# Patient Record
Sex: MALE | Race: BLACK OR AFRICAN AMERICAN | Employment: FULL TIME | URBAN - METROPOLITAN AREA
[De-identification: names, ages, dates, MRNs, and addresses within clinical notes are randomized per-mention and may not be internally consistent; named-entity substitution may affect disease eponyms.]

---

## 2017-11-08 PROBLEM — N52.9 ERECTILE DYSFUNCTION: Status: ACTIVE | Noted: 2017-11-08

## 2019-05-03 ENCOUNTER — OFFICE VISIT (OUTPATIENT)
Dept: FAMILY MEDICINE CLINIC | Age: 44
End: 2019-05-03

## 2019-05-03 VITALS
SYSTOLIC BLOOD PRESSURE: 122 MMHG | WEIGHT: 290 LBS | DIASTOLIC BLOOD PRESSURE: 78 MMHG | OXYGEN SATURATION: 98 % | RESPIRATION RATE: 16 BRPM | BODY MASS INDEX: 33.55 KG/M2 | TEMPERATURE: 96.4 F | HEART RATE: 91 BPM | HEIGHT: 78 IN

## 2019-05-03 DIAGNOSIS — L60.9 NAIL ABNORMALITIES: ICD-10-CM

## 2019-05-03 DIAGNOSIS — N52.9 ERECTILE DYSFUNCTION, UNSPECIFIED ERECTILE DYSFUNCTION TYPE: ICD-10-CM

## 2019-05-03 DIAGNOSIS — Z00.00 ROUTINE MEDICAL EXAM: ICD-10-CM

## 2019-05-03 DIAGNOSIS — E11.9 TYPE 2 DIABETES MELLITUS WITHOUT COMPLICATION, WITHOUT LONG-TERM CURRENT USE OF INSULIN (HCC): Primary | ICD-10-CM

## 2019-05-03 LAB — HBA1C MFR BLD HPLC: 6.4 %

## 2019-05-03 RX ORDER — METFORMIN HYDROCHLORIDE 1000 MG/1
1000 TABLET ORAL 2 TIMES DAILY WITH MEALS
COMMUNITY
End: 2019-05-10 | Stop reason: SDUPTHER

## 2019-05-03 RX ORDER — LANCETS
EACH MISCELLANEOUS
Qty: 100 EACH | Refills: 11 | Status: SHIPPED | OUTPATIENT
Start: 2019-05-03 | End: 2019-05-10 | Stop reason: SDUPTHER

## 2019-05-03 RX ORDER — INSULIN PUMP SYRINGE, 3 ML
EACH MISCELLANEOUS
Qty: 1 KIT | Refills: 0 | Status: SHIPPED | OUTPATIENT
Start: 2019-05-03 | End: 2019-05-10 | Stop reason: SDUPTHER

## 2019-05-03 RX ORDER — SILDENAFIL 50 MG/1
50 TABLET, FILM COATED ORAL AS NEEDED
Qty: 30 TAB | Refills: 0 | Status: SHIPPED | OUTPATIENT
Start: 2019-05-03 | End: 2019-05-10 | Stop reason: DRUGHIGH

## 2019-05-03 NOTE — PROGRESS NOTES
[address] 
             [phone] Subjective:  
Yodit Barney is a 41 y.o. male presenting for his annual checkup. ROS:  Feeling well. No dyspnea or chest pain on exertion. No abdominal pain, change in bowel habits, black or bloody stools. No urinary tract or prostatic symptoms. Endorses ED, has been evaluated by urology. No neurological complaints. No skin symptoms. No HENT symptoms, told he snores. No vision symptoms. No respiratory symptoms. Told he stops breathing when sleeping. Does not wake up with a headache. Does not feel sleepy during the day. Patient Active Problem List  
Diagnosis Code  Erectile dysfunction N52.9  Diabetes ([facility] E11.9  Controlled type 2 diabetes mellitus without complication, without long-term current use of insulin ([facility]) E11.9  
 Pulmonary nodules R91.8 Patient Active Problem List  
 Diagnosis Date Noted  Diabetes ([facility] 05/03/2019  Pulmonary nodules 11/20/2018  Controlled type 2 diabetes mellitus without complication, without long-term current use of insulin ([facility] 10/12/2018  Erectile dysfunction 11/08/2017 Current Outpatient Medications Medication Sig Dispense Refill  metFORMIN (GLUCOPHAGE) 1,000 mg tablet Take 1,000 mg by mouth two (2) times daily (with meals).  Blood-Glucose Meter monitoring kit Check blood sugar daily in the morning before eating 1 Kit 0  
 glucose blood VI test strips (ASCENSIA AUTODISC VI, ONE TOUCH ULTRA TEST VI) strip Check blood sugar daily in the morning before eating 50 Strip 6  
 sildenafil citrate (VIAGRA) 50 mg tablet Take 1 Tab by mouth as needed (ED,  take 30min before). 30 Tab 0  
 lancets misc Check glucose daily in morning before eating 100 Each 11 Allergies Allergen Reactions [id number] [facility]  Pollen Extracts Sneezing Past Medical History:  
Diagnosis Date  Diabetes ([facility] History reviewed. No pertinent surgical history. Family History Problem Relation Age of Onset  Diabetes Mother  Hypertension Mother Social History Tobacco Use  Smoking status: Light Tobacco Smoker  Smokeless tobacco: Never Used  Tobacco comment: black mild Substance Use Topics  Alcohol use: Yes Alcohol/week: 0.6 oz Types: 1 Cans of beer per week Comment: occ beer Objective:  
 
Visit Vitals /78 (BP 1 Location: Left arm, BP Patient Position: At rest) Pulse 91 Temp 96.4 °F (35.8 °C) Resp 16 Ht 6' 8\" (2.032 m) Wt 290 lb (131.5 kg) SpO2 98% BMI 31.86 kg/m² The patient appears well, alert, oriented x 3, in no distress. ENT normal.  Neck supple. No adenopathy or thyromegaly. BETZAIDA. Lungs are clear, good air entry, no wheezes, rhonchi or rales. S1 and S2 normal, no murmurs, regular rate and rhythm. Abdomen is soft without tenderness, guarding, mass or organomegaly.  exam: deferred  Extremities show no edema, normal peripheral pulses. Neurological is normal without focal findings. Toe nails with hyperpigmentation. Assessment/Plan:  
healthy adult male 
lose weight, increase physical activity, stop smoking (advice and handout given), follow low fat diet, follow low salt diet, continue present plan, routine labs ordered, call if any problems. ICD-10-CM ICD-9-CM 1. Type 2 diabetes mellitus without complication, without long-term current use of insulin ([facility]) E11.9 250.00 MICROALBUMIN, UR, RAND W/ MICROALB/CREAT RATIO Here today to establish care and manage chronic conditions. States his glucose has been controlled as evidenced by no s/s. Needs glucometer and supplies: ordered A1C: 6.4 LIPID PANEL   
   METABOLIC PANEL, COMPREHENSIVE Blood-Glucose Meter monitoring kit   
   glucose blood VI test strips (ASCENSIA AUTODISC VI, ONE TOUCH ULTRA TEST VI) strip   
   lancets misc    AMB POC HEMOGLOBIN A1C   
 2. Routine medical exam Z00.00 V70.0 CBC W/O DIFF Routine labs for physical  
   METABOLIC PANEL, COMPREHENSIVE 3. Nail abnormalities L60.9 703.9 REFERRAL TO PODIATRY Not sure of the cause of his hyperpigmentation, will refer to podiatry. 4. Erectile dysfunction, unspecified erectile dysfunction type N52.9 607.84 sildenafil citrate (VIAGRA) 50 mg tablet States ED managed will with viagara An After Visit Summary was printed and given to the patient. All diagnosis have been discussed with the patient and all of the patient's questions have been answered. Follow-up and Dispositions · Return in about 3 months (around 8/3/2019) for Diabetes, 15 minutes. Greater than 50% of the time was spent in counseling and coordination of care. Carole Baumann, KAREEM-BC [facility] [address] [place] [address]

## 2019-05-03 NOTE — PATIENT INSTRUCTIONS
Learning About Foot and Toenail Care Foot and toenail care: Overview Checking your loved one's feet and keeping them clean and soft can help prevent cracks and infection in the skin. This is especially important for people who have diabetes. Keeping toenails trimmedand polished if that's what the person likesalso helps the person feel well-groomed. If the person you care for has diabetes or has foot problems, such as bad bunions and corns, think about taking them to see a podiatrist. This is a doctor who specializes in the care of the feet. Sometimes a podiatrist will come to the home if the person can't go out for visits. Try to take the person for salon pedicures if that is what they want. It's a chance to get out and see people and continue a favorite activity. You can do basic nail care at home. Usually all you need to do is keep the nails clean and at a safe length. How do you trim someone's toenails? Try to trim the person's nails every week. Or check the nails each week to see if they need to be trimmed. It's easiest to trim nails after the person has had a shower or foot bath. It makes the nails softer and easier to trim. Start by gathering your supplies. You will need toenail clippers and a nail file. You may also need nail polish and nail polish remover. To trim the nails: 
1. Wash and dry your hands. You don't need to wear gloves. 2. Use nail polish remover to take off any polish. 3. Hold the person's foot and toe steady with one hand while you trim the nail with your other hand. Trim the nails straight across. Leave the nails a little longer at the corners so that the sharp ends don't cut into the skin. 4. Keep the nails no longer than the tip of the toes. 5. Let the nails dry if they are still damp and soft. 6. Use a nail file to gently smooth the edges of the nails, especially at the corners. They may be sharp after the nails are cut straight. 7. Apply nail polish, if the person wants it. If the person's nails are thick and discolored, it may be safest to have a podiatrist cut them. What else do you need to know? When you're caring for someone's nails, it is important to remember not to trim or cut the cuticles. A minor cut in a cuticle could lead to an infection. Wash the feet daily in the shower or bath or in a basin made for washing feet. It's extra important to wash the feet carefully if the person has diabetes. After washing the feet, dry gently. Put lotion on the feet, especially on the heels. But don't put it between the toes. If the person doesn't have diabetes and you see signs of athlete's foot (such as dry, cracking, or itchy skin between the toes), you can try an over-the-counter medicine. These medicines can kill the fungus that causes athlete's foot. If the problem doesn't go away, talk to the person's doctor. Look every day for cuts or signs of infection, such as pain, swelling, redness, or warmth. If you see any of these signsespecially in someone who has diabetescall the doctor. Where can you learn more? Go to http://ольга-luis.info/. Enter A726 in the search box to learn more about \"Learning About Foot and Toenail Care. \" Current as of: April 18, 2018 Content Version: 11.9 © 3638-3960 [employer]. Care instructions adapted under license by [employer] (which disclaims liability or warranty for this information). If you have questions about a medical condition or this instruction, always ask your healthcare professional. [address] any warranty or liability for your use of this information. Learning About Diabetes Food Guidelines Your Care Instructions Meal planning is important to manage diabetes. It helps keep your blood sugar at a target level (which you set with your doctor).  You don't have to eat special foods. You can eat what your family eats, including sweets once in a while. But you do have to pay attention to how often you eat and how much you eat of certain foods. You may want to work with a dietitian or a certified diabetes educator (CDE) to help you plan meals and snacks. A dietitian or CDE can also help you lose weight if that is one of your goals. What should you know about eating carbs? Managing the amount of carbohydrate (carbs) you eat is an important part of healthy meals when you have diabetes. Carbohydrate is found in many foods. · Learn which foods have carbs. And learn the amounts of carbs in different foods. ? Bread, cereal, pasta, and rice have about 15 grams of carbs in a serving. A serving is 1 slice of bread (1 ounce), ½ cup of cooked cereal, or 1/3 cup of cooked pasta or rice. ? Fruits have 15 grams of carbs in a serving. A serving is 1 small fresh fruit, such as an apple or orange; ½ of a banana; ½ cup of cooked or canned fruit; ½ cup of fruit juice; 1 cup of melon or raspberries; or 2 tablespoons of dried fruit. ? Milk and no-sugar-added yogurt have 15 grams of carbs in a serving. A serving is 1 cup of milk or 2/3 cup of no-sugar-added yogurt. ? Starchy vegetables have 15 grams of carbs in a serving. A serving is ½ cup of mashed potatoes or sweet potato; 1 cup winter squash; ½ of a small baked potato; ½ cup of cooked beans; or ½ cup cooked corn or green peas. · Learn how much carbs to eat each day and at each meal. A dietitian or CDE can teach you how to keep track of the amount of carbs you eat. This is called carbohydrate counting. · If you are not sure how to count carbohydrate grams, use the Plate Method to plan meals. It is a good, quick way to make sure that you have a balanced meal. It also helps you spread carbs throughout the day. ? Divide your plate by types of foods.  Put non-starchy vegetables on half the plate, meat or other protein food on one-quarter of the plate, and a grain or starchy vegetable in the final quarter of the plate. To this you can add a small piece of fruit and 1 cup of milk or yogurt, depending on how many carbs you are supposed to eat at a meal. 
· Try to eat about the same amount of carbs at each meal. Do not \"save up\" your daily allowance of carbs to eat at one meal. 
· Proteins have very little or no carbs per serving. Examples of proteins are beef, chicken, turkey, fish, eggs, tofu, cheese, cottage cheese, and peanut butter. A serving size of meat is 3 ounces, which is about the size of a deck of cards. Examples of meat substitute serving sizes (equal to 1 ounce of meat) are 1/4 cup of cottage cheese, 1 egg, 1 tablespoon of peanut butter, and ½ cup of tofu. How can you eat out and still eat healthy? · Learn to estimate the serving sizes of foods that have carbohydrate. If you measure food at home, it will be easier to estimate the amount in a serving of restaurant food. · If the meal you order has too much carbohydrate (such as potatoes, corn, or baked beans), ask to have a low-carbohydrate food instead. Ask for a salad or green vegetables. · If you use insulin, check your blood sugar before and after eating out to help you plan how much to eat in the future. · If you eat more carbohydrate at a meal than you had planned, take a walk or do other exercise. This will help lower your blood sugar. What else should you know? · Limit saturated fat, such as the fat from meat and dairy products. This is a healthy choice because people who have diabetes are at higher risk of heart disease. So choose lean cuts of meat and nonfat or low-fat dairy products. Use olive or canola oil instead of butter or shortening when cooking. · Don't skip meals. Your blood sugar may drop too low if you skip meals and take insulin or certain medicines for diabetes. · Check with your doctor before you drink alcohol. Alcohol can cause your blood sugar to drop too low. Alcohol can also cause a bad reaction if you take certain diabetes medicines. Follow-up care is a key part of your treatment and safety. Be sure to make and go to all appointments, and call your doctor if you are having problems. It's also a good idea to know your test results and keep a list of the medicines you take. Where can you learn more? Go to http://ольга-luis.info/. Enter J316 in the search box to learn more about \"Learning About Diabetes Food Guidelines. \" Current as of: July 25, 2018 Content Version: 11.9 © 8485-8200 [employer]. Care instructions adapted under license by [employer] (which disclaims liability or warranty for this information). If you have questions about a medical condition or this instruction, always ask your healthcare professional. [place] [address] any warranty or liability for your use of this information. Well Visit, Ages 25 to 48: Care Instructions Your Care Instructions Physical exams can help you stay healthy. Your doctor has checked your overall health and may have suggested ways to take good care of yourself. He or she also may have recommended tests. At home, you can help prevent illness with healthy eating, regular exercise, and other steps. Follow-up care is a key part of your treatment and safety. Be sure to make and go to all appointments, and call your doctor if you are having problems. It's also a good idea to know your test results and keep a list of the medicines you take. How can you care for yourself at home? · Reach and stay at a healthy weight. This will lower your risk for many problems, such as obesity, diabetes, heart disease, and high blood pressure. · Get at least 30 minutes of physical activity on most days of the week. Walking is a good choice. You also may want to do other activities, such as running, swimming, cycling, or playing tennis or team sports. Discuss any changes in your exercise program with your doctor. · Do not smoke or allow others to smoke around you. If you need help quitting, talk to your doctor about stop-smoking programs and medicines. These can increase your chances of quitting for good. · Talk to your doctor about whether you have any risk factors for sexually transmitted infections (STIs). Having one sex partner (who does not have STIs and does not have sex with anyone else) is a good way to avoid these infections. · Use birth control if you do not want to have children at this time. Talk with your doctor about the choices available and what might be best for you. · Protect your skin from too much sun. When you're outdoors from 10 a.m. to 4 p.m., stay in the shade or cover up with clothing and a hat with a wide brim. Wear sunglasses that block UV rays. Even when it's cloudy, put broad-spectrum sunscreen (SPF 30 or higher) on any exposed skin. · See a dentist one or two times a year for checkups and to have your teeth cleaned. · Wear a seat belt in the car. · Drink alcohol in moderation, if at all. That means no more than 2 drinks a day for men and 1 drink a day for women. Follow your doctor's advice about when to have certain tests. These tests can spot problems early. For everyone · Cholesterol. Have the fat (cholesterol) in your blood tested after age 21. Your doctor will tell you how often to have this done based on your age, family history, or other things that can increase your risk for heart disease. · Blood pressure. Have your blood pressure checked during a routine doctor visit. Your doctor will tell you how often to check your blood pressure based on your age, your blood pressure results, and other factors. · Vision.  Talk with your doctor about how often to have a glaucoma test. 
 · Diabetes. Ask your doctor whether you should have tests for diabetes. · Colon cancer. Have a test for colon cancer at age 48. You may have one of several tests. If you are younger than 48, you may need a test earlier if you have any risk factors. Risk factors include whether you already had a precancerous polyp removed from your colon or whether your parent, brother, sister, or child has had colon cancer. For women · Breast exam and mammogram. Talk to your doctor about when you should have a clinical breast exam and a mammogram. Medical experts differ on whether and how often women under 50 should have these tests. Your doctor can help you decide what is right for you. · Pap test and pelvic exam. Begin Pap tests at age 24. A Pap test is the best way to find cervical cancer. The test often is part of a pelvic exam. Ask how often to have this test. 
· Tests for sexually transmitted infections (STIs). Ask whether you should have tests for STIs. You may be at risk if you have sex with more than one person, especially if your partners do not wear condoms. For men · Tests for sexually transmitted infections (STIs). Ask whether you should have tests for STIs. You may be at risk if you have sex with more than one person, especially if you do not wear a condom. · Testicular cancer exam. Ask your doctor whether you should check your testicles regularly. · Prostate exam. Talk to your doctor about whether you should have a blood test (called a PSA test) for prostate cancer. Experts differ on whether and when men should have this test. Some experts suggest it if you are older than 39 and are -American or have a father or brother who got prostate cancer when he was younger than 72. When should you call for help? Watch closely for changes in your health, and be sure to contact your doctor if you have any problems or symptoms that concern you. Where can you learn more? Go to http://ольга-luis.info/. Enter P072 in the search box to learn more about \"Well Visit, Ages 25 to 48: Care Instructions. \" Current as of: March 28, 2018 Content Version: 11.9 © 7884-7838 [employer]. Care instructions adapted under license by [employer] (which disclaims liability or warranty for this information). If you have questions about a medical condition or this instruction, always ask your healthcare professional. [place] [address] any warranty or liability for your use of this information.

## 2019-05-06 LAB
ALBUMIN SERPL-MCNC: 4.1 G/DL (ref 3.5–5.5)
ALBUMIN/GLOB SERPL: 1.6 {RATIO} (ref 1.2–2.2)
ALP SERPL-CCNC: 73 IU/L (ref 39–117)
ALT SERPL-CCNC: 42 IU/L (ref 0–44)
AST SERPL-CCNC: 32 IU/L (ref 0–40)
BILIRUB SERPL-MCNC: <0.2 MG/DL (ref 0–1.2)
BUN SERPL-MCNC: 14 MG/DL (ref 6–24)
BUN/CREAT SERPL: 15 (ref 9–20)
CALCIUM SERPL-MCNC: 8.9 MG/DL (ref 8.7–10.2)
CHLORIDE SERPL-SCNC: 107 MMOL/L (ref 96–106)
CHOLEST SERPL-MCNC: 164 MG/DL (ref 100–199)
CO2 SERPL-SCNC: 22 MMOL/L (ref 20–29)
CREAT SERPL-MCNC: 0.95 MG/DL (ref 0.76–1.27)
CREAT UR-MCNC: NORMAL MG/DL
ERYTHROCYTE [DISTWIDTH] IN BLOOD BY AUTOMATED COUNT: 14.2 % (ref 12.3–15.4)
GLOBULIN SER CALC-MCNC: 2.6 G/DL (ref 1.5–4.5)
GLUCOSE SERPL-MCNC: 99 MG/DL (ref 65–99)
HCT VFR BLD AUTO: 39.6 % (ref 37.5–51)
HDLC SERPL-MCNC: 31 MG/DL
HGB BLD-MCNC: 13 G/DL (ref 13–17.7)
INTERPRETATION, 910389: NORMAL
LDLC SERPL CALC-MCNC: 86 MG/DL (ref 0–99)
MCH RBC QN AUTO: 28.8 PG (ref 26.6–33)
MCHC RBC AUTO-ENTMCNC: 32.8 G/DL (ref 31.5–35.7)
MCV RBC AUTO: 88 FL (ref 79–97)
MICROALBUMIN UR-MCNC: NORMAL
PLATELET # BLD AUTO: 213 X10E3/UL (ref 150–379)
POTASSIUM SERPL-SCNC: 4.3 MMOL/L (ref 3.5–5.2)
PROT SERPL-MCNC: 6.7 G/DL (ref 6–8.5)
RBC # BLD AUTO: 4.51 X10E6/UL (ref 4.14–5.8)
SODIUM SERPL-SCNC: 144 MMOL/L (ref 134–144)
SPECIMEN STATUS REPORT, ROLRST: NORMAL
TRIGL SERPL-MCNC: 237 MG/DL (ref 0–149)
VLDLC SERPL CALC-MCNC: 47 MG/DL (ref 5–40)
WBC # BLD AUTO: 6.6 X10E3/UL (ref 3.4–10.8)

## 2019-05-08 PROBLEM — E11.9 CONTROLLED TYPE 2 DIABETES MELLITUS WITHOUT COMPLICATION, WITHOUT LONG-TERM CURRENT USE OF INSULIN (HCC): Status: ACTIVE | Noted: 2018-10-12

## 2019-05-08 PROBLEM — R91.8 PULMONARY NODULES: Status: ACTIVE | Noted: 2018-11-20

## 2019-05-10 DIAGNOSIS — N52.9 ERECTILE DYSFUNCTION, UNSPECIFIED ERECTILE DYSFUNCTION TYPE: ICD-10-CM

## 2019-05-10 DIAGNOSIS — E11.9 TYPE 2 DIABETES MELLITUS WITHOUT COMPLICATION, WITHOUT LONG-TERM CURRENT USE OF INSULIN (HCC): ICD-10-CM

## 2019-05-10 RX ORDER — SILDENAFIL 50 MG/1
50 TABLET, FILM COATED ORAL AS NEEDED
Qty: 30 TAB | Refills: 0 | OUTPATIENT
Start: 2019-05-10

## 2019-05-10 RX ORDER — SILDENAFIL 25 MG/1
25 TABLET, FILM COATED ORAL AS NEEDED
Qty: 10 TAB | Refills: 0 | Status: SHIPPED | OUTPATIENT
Start: 2019-05-10 | End: 2019-10-15

## 2019-05-10 RX ORDER — INSULIN PUMP SYRINGE, 3 ML
EACH MISCELLANEOUS
Qty: 1 KIT | Refills: 0 | Status: SHIPPED | OUTPATIENT
Start: 2019-05-10

## 2019-05-10 RX ORDER — METFORMIN HYDROCHLORIDE 1000 MG/1
1000 TABLET ORAL 2 TIMES DAILY WITH MEALS
Qty: 60 TAB | Refills: 2 | Status: SHIPPED | OUTPATIENT
Start: 2019-05-10 | End: 2019-10-16 | Stop reason: SDUPTHER

## 2019-05-10 RX ORDER — LANCETS
EACH MISCELLANEOUS
Qty: 100 EACH | Refills: 11 | Status: SHIPPED | OUTPATIENT
Start: 2019-05-10

## 2019-05-10 RX ORDER — SILDENAFIL CITRATE 20 MG/1
TABLET ORAL
Qty: 90 TAB | Refills: 4 | OUTPATIENT
Start: 2019-05-10

## 2019-05-10 NOTE — TELEPHONE ENCOUNTER
Patient called and requested a muscle relaxer. Medication sent to wrong pharmacy. Patient requesting smaller dosage of Viagra, medication is too expensive.

## 2019-05-10 NOTE — TELEPHONE ENCOUNTER
If he wants a muscle relaxer he needs to make an appointment. Sent RX for 25mg viagra 10 tabs, this is the lowest dose.

## 2019-05-22 ENCOUNTER — TELEPHONE (OUTPATIENT)
Dept: FAMILY MEDICINE CLINIC | Age: 44
End: 2019-05-22

## 2019-10-15 ENCOUNTER — OFFICE VISIT (OUTPATIENT)
Dept: FAMILY MEDICINE CLINIC | Age: 44
End: 2019-10-15

## 2019-10-15 VITALS
DIASTOLIC BLOOD PRESSURE: 49 MMHG | HEART RATE: 94 BPM | OXYGEN SATURATION: 98 % | HEIGHT: 78 IN | BODY MASS INDEX: 31.59 KG/M2 | TEMPERATURE: 98.5 F | SYSTOLIC BLOOD PRESSURE: 116 MMHG | RESPIRATION RATE: 17 BRPM | WEIGHT: 273 LBS

## 2019-10-15 DIAGNOSIS — E78.2 MIXED HYPERLIPIDEMIA: ICD-10-CM

## 2019-10-15 DIAGNOSIS — L73.2 HYDRADENITIS: Primary | ICD-10-CM

## 2019-10-15 DIAGNOSIS — E11.9 CONTROLLED TYPE 2 DIABETES MELLITUS WITHOUT COMPLICATION, WITHOUT LONG-TERM CURRENT USE OF INSULIN (HCC): ICD-10-CM

## 2019-10-15 DIAGNOSIS — Z23 ENCOUNTER FOR IMMUNIZATION: ICD-10-CM

## 2019-10-15 DIAGNOSIS — L08.9 SOFT TISSUE INFECTION: ICD-10-CM

## 2019-10-15 RX ORDER — DOXYCYCLINE 100 MG/1
100 CAPSULE ORAL 2 TIMES DAILY
Qty: 20 CAP | Refills: 0 | Status: SHIPPED | OUTPATIENT
Start: 2019-10-15 | End: 2019-10-22

## 2019-10-15 NOTE — PROGRESS NOTES
ROOM # 8  Identified pt with two pt identifiers(name and ). Reviewed record in preparation for visit and have obtained necessary documentation. Chief Complaint   Patient presents with    Sweats     Requesting medication     Back Pain     x 2 days      Charbel Jimenez preferred language for health care discussion is english/other. Is the patient using any DME equipment during OV? Jurline Last is due for:  Health Maintenance Due   Topic    Pneumococcal 0-64 years (1 of 1 - PPSV23)    FOOT EXAM Q1     EYE EXAM RETINAL OR DILATED     DTaP/Tdap/Td series (1 - Tdap)    Influenza Age 5 to Adult     HEMOGLOBIN A1C Q6M      Health Maintenance reviewed and discussed per provider  Please order/place referral if appropriate. Advance Directive:  1. Do you have an advance directive in place? Patient Reply: NO    2. If not, would you like material regarding how to put one in place? NO    Coordination of Care:  1. Have you been to the ER, urgent care clinic since your last visit? Hospitalized since your last visit? NO    2. Have you seen or consulted any other health care providers outside of the [address] [place] [address] since your last visit? Include any pap smears or colon screening. NO    Patient is accompanied by self I have received verbal consent from Charbel Jimenez to discuss any/all medical information while they are present in the room.     Learning Assessment:  Learning Assessment 5/3/2019   PRIMARY LEARNER Patient   HIGHEST LEVEL OF EDUCATION - PRIMARY LEARNER  GRADUATED HIGH SCHOOL OR GED   BARRIERS PRIMARY LEARNER NONE   CO-LEARNER CAREGIVER No   PRIMARY LANGUAGE ENGLISH   LEARNER PREFERENCE PRIMARY DEMONSTRATION   ANSWERED BY self    RELATIONSHIP SELF     Depression Screening:  3 most recent PHQ Screens 5/3/2019   Little interest or pleasure in doing things Not at all   Feeling down, depressed, irritable, or hopeless Not at all   Total Score PHQ 2 0     Abuse Screening:  Abuse Screening Questionnaire 5/3/2019   Do you ever feel afraid of your partner? N   Are you in a relationship with someone who physically or mentally threatens you? N   Is it safe for you to go home? Y     Fall Risk  N/i    After obtaining consent, and per orders of  MICHELE Vides, NP, injection of Flulaval via left arm given by Berenice Shankar. Patient instructed to remain in clinic for 20 minutes afterwards, and to report any adverse reaction to me immediately.

## 2019-10-15 NOTE — PROGRESS NOTES
Tyler               [address]               [phone]      Speedy Galvez is a 41 y.o. male and presents with Sweats (Requesting medication ) and Back Pain (x 2 days)       Assessment/Plan:    Diagnoses and all orders for this visit:    1. Hydradenitis  Reason for visit  Located in axilla  Has been treated with doxycycline in the past with good results  Will give course of doxy for treatment today  If problem persists will refer to Dermatology    2. Encounter for immunization  -     INFLUENZA VIRUS VAC QUAD,SPLIT,PRESV FREE SYRINGE IM  -     IN IMMUNIZ ADMIN,1 SINGLE/COMB VAC/TOXOID  Health maintenance need for influenza vacc administered today    3. Controlled type 2 diabetes mellitus without complication, without long-term current use of insulin (HCC)  -     HEMOGLOBIN A1C WITH EAG; Future  -     METABOLIC PANEL, COMPREHENSIVE; Future  -     LIPID PANEL; Future  Check A1C  Denies s/s of hyperglycemia  Continue same meds     4. Soft tissue infection  Course of doxycycline    5. Mixed hyperlipidemia  -     LIPID PANEL; Future  Recheck levels today    Other orders  -     CVD REPORT  -     DIABETES PATIENT EDUCATION  -     atorvastatin (LIPITOR) 20 mg tablet; Take 1 Tab by mouth daily. Follow-up and Dispositions    · Return in about 3 months (around 1/15/2020) for Diabetes, 15 minutes. Subjective:    hidradenitis   Recurrent problem  In axilla and groin  Cyst, some with pink purulent drain  Denies pain unless irritated  Has not tried any treatments, nothing makes it better, nothing makes it worse    DMII-   Patient reports medication compliance Daily  Diabetic diet compliance most of the time  Patient monitors blood sugars regularly never   Reports am fasting sugars range does not check   Denies hypoglycemic episodes.  yes   Last eye exam approximately greater than one year  Hemoglobin A1c   Date Value Ref Range Status   10/15/2019 6.2 (H) 4.8 - 5.6 % Final Comment:              Prediabetes: 5.7 - 6.4           Diabetes: >6.4           Glycemic control for adults with diabetes: <7.0     ]  Creatinine, urine   Date Value Ref Range Status   05/04/2019 CANCELED mg/dL      Comment:     Test Not Performed. Patient was unable to void at the time of  collection. The following test(s) were not performed:    Result canceled by the ancillary. Key Antihyperglycemic Medications             metFORMIN (GLUCOPHAGE) 1,000 mg tablet (Taking/Discontinued) Take 1 Tab by mouth two (2) times daily (with meals). HLD:  Has been compliant with meds yes  Compliant with low-fat diet. yes    Denies myalgias or other side effects. yes  Cholesterol, total   Date Value Ref Range Status   10/15/2019 213 (H) 100 - 199 mg/dL Final     Triglyceride   Date Value Ref Range Status   10/15/2019 298 (H) 0 - 149 mg/dL Final     HDL Cholesterol   Date Value Ref Range Status   10/15/2019 35 (L) >39 mg/dL Final     LDL, calculated   Date Value Ref Range Status   10/15/2019 118 (H) 0 - 99 mg/dL Final   ]        ROS: As stated in HPI, otherwise all others negative. ROS    The problem list was updated as a part of today's visit. Patient Active Problem List   Diagnosis Code    Erectile dysfunction N52.9    Diabetes ([facility] E11.9    Controlled type 2 diabetes mellitus without complication, without long-term current use of insulin ([facility] E11.9    Pulmonary nodules R91.8    Hydradenitis L73.2    Mixed hyperlipidemia E78.2       The PSH, FH were reviewed. SH:  Social History     Tobacco Use    Smoking status: Light Tobacco Smoker    Smokeless tobacco: Never Used    Tobacco comment: black mild    Substance Use Topics    Alcohol use:  Yes     Alcohol/week: 1.0 standard drinks     Types: 1 Cans of beer per week     Comment: occ beer    Drug use: Yes     Types: Marijuana     Comment: occ       Medications/Allergies:  Current Outpatient Medications on File Prior to Visit   Medication Sig Dispense Refill    Blood-Glucose Meter monitoring kit Check blood sugar daily in the morning before eating 1 Kit 0    glucose blood VI test strips (ASCENSIA AUTODISC VI, ONE TOUCH ULTRA TEST VI) strip Check blood sugar daily in the morning before eating 50 Strip 6    lancets misc Check glucose daily in morning before eating 100 Each 11     No current facility-administered medications on file prior to visit. Allergies   Allergen Reactions    Chlorpheniramine-Phenylpropan Runny Nose    Pollen Extracts Sneezing       Objective:  Visit Vitals  /49 (BP 1 Location: Left arm, BP Patient Position: Sitting)   Pulse 94   Temp 98.5 °F (36.9 °C) (Oral)   Resp 17   Ht 6' 8\" (2.032 m)   Wt 273 lb (123.8 kg)   SpO2 98%   BMI 29.99 kg/m²    Body mass index is 29.99 kg/m². Physical assessment  Physical Exam   Constitutional: He is oriented to person, place, and time and well-developed, well-nourished, and in no distress. Eyes: Pupils are equal, round, and reactive to light. Conjunctivae are normal.   Neck: Normal range of motion. No JVD present. Cardiovascular: Normal rate, regular rhythm and normal heart sounds. Exam reveals no gallop and no friction rub. No murmur heard. Pulmonary/Chest: Effort normal and breath sounds normal.   Musculoskeletal: Normal range of motion. Neurological: He is alert and oriented to person, place, and time. Skin: Skin is warm and dry. Lesion noted. Large elongated cysts to bilateral axilla  Some with pink purulent drainage  Surrounding skin without erythema  Tender to palpation   Psychiatric: Memory, affect and judgment normal.   Nursing note and vitals reviewed.         Labwork and Ancillary Studies:    CBC w/Diff  Lab Results   Component Value Date/Time    WBC 6.6 05/04/2019 12:00 AM    HGB 13.0 05/04/2019 12:00 AM    PLATELET 599 41/64/3035 12:00 AM         Basic Metabolic Profile  Lab Results   Component Value Date/Time    Sodium 144 10/15/2019 02:27 PM    Potassium 3.9 10/15/2019 02:27 PM    Chloride 102 10/15/2019 02:27 PM    CO2 25 10/15/2019 02:27 PM    Glucose 106 (H) 10/15/2019 02:27 PM    BUN 12 10/15/2019 02:27 PM    Creatinine 0.85 10/15/2019 02:27 PM    BUN/Creatinine ratio 14 10/15/2019 02:27 PM    GFR est  10/15/2019 02:27 PM    GFR est non- 10/15/2019 02:27 PM    Calcium 9.3 10/15/2019 02:27 PM        Cholesterol  Lab Results   Component Value Date/Time    Cholesterol, total 213 (H) 10/15/2019 02:27 PM    HDL Cholesterol 35 (L) 10/15/2019 02:27 PM    LDL, calculated 118 (H) 10/15/2019 02:27 PM    Triglyceride 298 (H) 10/15/2019 02:27 PM       Health Maintenance:   Health Maintenance   Topic Date Due    Pneumococcal 0-64 years (1 of 1 - PPSV23) 11/01/1983    FOOT EXAM Q1  11/01/1987    EYE EXAM RETINAL OR DILATED  11/01/1987    DTaP/Tdap/Td series (1 - Tdap) 11/01/1998    HEMOGLOBIN A1C Q6M  04/15/2020    MICROALBUMIN Q1  05/04/2020    LIPID PANEL Q1  10/15/2020    Influenza Age 9 to Adult  Completed       I have discussed the diagnosis with the patient and the intended plan as seen in the above orders. The patient has received an After-Visit Summary and questions were answered concerning future plans. An After Visit Summary was printed and given to the patient. All diagnosis have been discussed with the patient and all of the patient's questions have been answered. Follow-up and Dispositions    · Return in about 3 months (around 1/15/2020) for Diabetes, 15 minutes. Lilian Bloom, AGNP-BC  [facility] [place]   [address] [place]  [id number]

## 2019-10-16 LAB
ALBUMIN SERPL-MCNC: 4.1 G/DL (ref 3.5–5.5)
ALBUMIN/GLOB SERPL: 1.5 {RATIO} (ref 1.2–2.2)
ALP SERPL-CCNC: 65 IU/L (ref 39–117)
ALT SERPL-CCNC: 34 IU/L (ref 0–44)
AST SERPL-CCNC: 23 IU/L (ref 0–40)
BILIRUB SERPL-MCNC: 0.2 MG/DL (ref 0–1.2)
BUN SERPL-MCNC: 12 MG/DL (ref 6–24)
BUN/CREAT SERPL: 14 (ref 9–20)
CALCIUM SERPL-MCNC: 9.3 MG/DL (ref 8.7–10.2)
CHLORIDE SERPL-SCNC: 102 MMOL/L (ref 96–106)
CHOLEST SERPL-MCNC: 213 MG/DL (ref 100–199)
CO2 SERPL-SCNC: 25 MMOL/L (ref 20–29)
CREAT SERPL-MCNC: 0.85 MG/DL (ref 0.76–1.27)
EST. AVERAGE GLUCOSE BLD GHB EST-MCNC: 131 MG/DL
GLOBULIN SER CALC-MCNC: 2.7 G/DL (ref 1.5–4.5)
GLUCOSE SERPL-MCNC: 106 MG/DL (ref 65–99)
HBA1C MFR BLD: 6.2 % (ref 4.8–5.6)
HDLC SERPL-MCNC: 35 MG/DL
INTERPRETATION, 910389: NORMAL
LDLC SERPL CALC-MCNC: 118 MG/DL (ref 0–99)
Lab: NORMAL
POTASSIUM SERPL-SCNC: 3.9 MMOL/L (ref 3.5–5.2)
PROT SERPL-MCNC: 6.8 G/DL (ref 6–8.5)
SODIUM SERPL-SCNC: 144 MMOL/L (ref 134–144)
TRIGL SERPL-MCNC: 298 MG/DL (ref 0–149)
VLDLC SERPL CALC-MCNC: 60 MG/DL (ref 5–40)

## 2019-10-16 RX ORDER — METFORMIN HYDROCHLORIDE 1000 MG/1
1000 TABLET ORAL 2 TIMES DAILY WITH MEALS
Qty: 60 TAB | Refills: 2 | Status: SHIPPED | OUTPATIENT
Start: 2019-10-16 | End: 2020-01-26

## 2019-10-21 RX ORDER — ATORVASTATIN CALCIUM 20 MG/1
20 TABLET, FILM COATED ORAL DAILY
Qty: 30 TAB | Refills: 3 | Status: SHIPPED | OUTPATIENT
Start: 2019-10-21 | End: 2020-03-25

## 2019-10-21 NOTE — PROGRESS NOTES
Please let him know his cholesterol level is elevated so this in combination with his diabetes he should start on a cholesterol-lowering medication. Atorvastatin/Lipitor 20 mg 1 tablet by mouth once a day with dinner has been sent to the pharmacy on file.

## 2019-10-21 NOTE — PROGRESS NOTES
Called patient and left voicemail to call us back in regards to lab results.  Thank you PMD Richie Sky  89F hx htn hld cad chf dm dementia presents s/p fall. Pt lives at home with daughter fell yesterday when alone walking towards the bathroom. Today pt unable to ambulate with severe pain in right hip and left shoulder. PMD visited patient at home and told to come to ED. Grandson at bedside with patient unsure of normal mental status. On arrival pt hypotensive tachycardic and hypoxic. PMD Richie Sky  89F hx htn hld cad chf dm dementia presents s/p fall. Pt lives at home with daughter fell yesterday when alone walking towards the bathroom. Today pt unable to ambulate with severe pain in both hips and left shoulder. PMD visited patient at home and told to come to ED. Grandson at bedside with patient unsure of normal mental status. On arrival pt hypotensive tachycardic and hypoxic.

## 2019-10-22 DIAGNOSIS — L73.2 HYDRADENITIS: Primary | ICD-10-CM

## 2019-10-22 RX ORDER — DOXYCYCLINE 100 MG/1
100 CAPSULE ORAL 2 TIMES DAILY
Qty: 20 CAP | Refills: 0 | Status: SHIPPED | OUTPATIENT
Start: 2019-10-22 | End: 2020-07-06

## 2019-10-24 PROBLEM — E78.2 MIXED HYPERLIPIDEMIA: Status: ACTIVE | Noted: 2019-10-24

## 2020-02-29 RX ORDER — METFORMIN HYDROCHLORIDE 1000 MG/1
TABLET ORAL
Qty: 60 TAB | Refills: 0 | Status: SHIPPED | OUTPATIENT
Start: 2020-02-29 | End: 2020-03-25

## 2020-07-06 RX ORDER — ATORVASTATIN CALCIUM 20 MG/1
TABLET, FILM COATED ORAL
Qty: 90 TAB | Refills: 0 | OUTPATIENT
Start: 2020-07-06

## 2020-07-06 RX ORDER — METFORMIN HYDROCHLORIDE 1000 MG/1
TABLET ORAL
Qty: 180 TAB | Refills: 0 | Status: CANCELLED | OUTPATIENT
Start: 2020-07-06

## 2020-07-06 RX ORDER — METFORMIN HYDROCHLORIDE 1000 MG/1
TABLET ORAL
Qty: 180 TAB | Refills: 0 | OUTPATIENT
Start: 2020-07-06

## 2020-07-06 RX ORDER — DOXYCYCLINE 100 MG/1
100 CAPSULE ORAL 2 TIMES DAILY
Qty: 20 CAP | Refills: 0 | Status: CANCELLED | OUTPATIENT
Start: 2020-07-06

## 2020-07-10 ENCOUNTER — VIRTUAL VISIT (OUTPATIENT)
Dept: FAMILY MEDICINE CLINIC | Age: 45
End: 2020-07-10

## 2020-07-10 DIAGNOSIS — E11.9 CONTROLLED TYPE 2 DIABETES MELLITUS WITHOUT COMPLICATION, WITHOUT LONG-TERM CURRENT USE OF INSULIN (HCC): Primary | ICD-10-CM

## 2020-07-10 DIAGNOSIS — E11.9 CONTROLLED TYPE 2 DIABETES MELLITUS WITHOUT COMPLICATION, WITHOUT LONG-TERM CURRENT USE OF INSULIN (HCC): ICD-10-CM

## 2020-07-10 DIAGNOSIS — E78.2 MIXED HYPERLIPIDEMIA: ICD-10-CM

## 2020-07-10 DIAGNOSIS — L73.2 HYDRADENITIS: ICD-10-CM

## 2020-07-10 RX ORDER — ATORVASTATIN CALCIUM 20 MG/1
TABLET, FILM COATED ORAL
Qty: 90 TAB | Refills: 0 | Status: SHIPPED | OUTPATIENT
Start: 2020-07-10 | End: 2021-01-05 | Stop reason: SDUPTHER

## 2020-07-10 RX ORDER — METFORMIN HYDROCHLORIDE 1000 MG/1
TABLET ORAL
Qty: 180 TAB | Refills: 0 | Status: SHIPPED | OUTPATIENT
Start: 2020-07-10 | End: 2020-09-15 | Stop reason: SDUPTHER

## 2020-07-10 NOTE — PROGRESS NOTES
Tyler               [address]               [phone]      Otis Palacios is a 45 y.o. male who was seen by synchronous (real-time) audio-video technology on 7/10/2020. Consent: Otis Palacios, who was seen by synchronous (real-time) audio-video technology, and/or his healthcare decision maker, is aware that this patient-initiated, Telehealth encounter on 7/10/2020 is a billable service, with coverage as determined by his insurance carrier. He is aware that he may receive a bill and has provided verbal consent to proceed: Yes. Assessment & Plan:   Diagnoses and all orders for this visit:    1. Controlled type 2 diabetes mellitus without complication, without long-term current use of insulin (HCC)  -     metFORMIN (GLUCOPHAGE) 1,000 mg tablet; TAKE 1 TABLET BY MOUTH TWICE A DAY WITH MEALS  -     HEMOGLOBIN A1C WITH EAG; Future  -     MICROALBUMIN, UR, RAND W/ MICROALB/CREAT RATIO; Future  -     METABOLIC PANEL, COMPREHENSIVE; Future  Taking medications as instructed  Does not check glucose at home but denies any s/s of hyper or hypoglycemia  Refill provided  Have him come in for labs    2. Mixed hyperlipidemia  -     atorvastatin (LIPITOR) 20 mg tablet; TAKE 1 TABLET BY MOUTH EVERY DAY  -     LIPID PANEL; Future  Taking medications as instructed  Refill provided  Have him come in for labs    3. Hydradenitis  -     REFERRAL TO DERMATOLOGY  Continues to have problems with HS  Refer to dermatology for further managment    Follow-up and Dispositions    · Return for asap labs  AND 3 months , DM, DM foot, HLD, office only.              [phone]  Subjective:   Otis Palacios is a 45 y.o. male who was seen for   Other (Sweat glands ) and Medication Refill    DMII-   Patient reports medication compliance Daily  Diabetic diet compliance most of the time  Patient monitors blood sugars regularly never   Reports am fasting sugars range does not check   Denies hypoglycemic episodes yes  Denies polyuria, polydipsia, paraesthesia, vision changes? no     Diabetic Foot and Eye Exam HM Status   Topic Date Due    Diabetic Foot Care  11/01/1987    Eye Exam  11/01/1987     Hemoglobin A1c   Date Value Ref Range Status   07/16/2020 6.4 (H) 4.8 - 5.6 % Final     Comment:              Prediabetes: 5.7 - 6.4           Diabetes: >6.4           Glycemic control for adults with diabetes: <7.0     ]  Creatinine, urine   Date Value Ref Range Status   07/16/2020 35.9 Not Estab. mg/dL Final     Microalb/Creat ratio (ug/mg creat.)   Date Value Ref Range Status   07/16/2020 <8 0 - 29 mg/g creat Final     Comment:                            Normal:                0 -  29                         Moderately increased: 30 - 300                         Severely increased:       >300                **Please note reference interval change**       Key Antihyperglycemic Medications             metFORMIN (GLUCOPHAGE) 1,000 mg tablet (Taking) TAKE 1 TABLET BY MOUTH TWICE A DAY WITH MEALS        HLD:  Has been compliant with meds  all of the time  Compliant with low-fat diet. most of the time    Denies myalgias or other side effects. yes  Cholesterol, total   Date Value Ref Range Status   07/16/2020 125 100 - 199 mg/dL Final     Triglyceride   Date Value Ref Range Status   07/16/2020 87 0 - 149 mg/dL Final     HDL Cholesterol   Date Value Ref Range Status   07/16/2020 35 (L) >39 mg/dL Final     LDL, calculated   Date Value Ref Range Status   07/16/2020 73 0 - 99 mg/dL Final   ]  Key Antihyperlipidemia Meds             atorvastatin (LIPITOR) 20 mg tablet (Taking) TAKE 1 TABLET BY MOUTH EVERY DAY           Prior to Admission medications    Medication Sig Start Date End Date Taking?  Authorizing Provider   atorvastatin (LIPITOR) 20 mg tablet TAKE 1 TABLET BY MOUTH EVERY DAY 7/10/20  Yes Demetrius Sims NP   metFORMIN (GLUCOPHAGE) 1,000 mg tablet TAKE 1 TABLET BY MOUTH TWICE A DAY WITH MEALS 7/10/20  Yes Ashutosh Langley L, NP   Blood-Glucose Meter monitoring kit Check blood sugar daily in the morning before eating 5/10/19  Yes Adelia Nuñez NP   glucose blood VI test strips (ASCENSIA AUTODISC VI, ONE TOUCH ULTRA TEST VI) strip Check blood sugar daily in the morning before eating 5/10/19  Yes Adelia Nuñez NP   lancets misc Check glucose daily in morning before eating 5/10/19  Yes Adelia Nuñez NP     Allergies   Allergen Reactions    Chlorpheniramine-Phenylpropan Runny Nose    Pollen Extracts Sneezing       Patient Active Problem List   Diagnosis Code    Erectile dysfunction N52.9    Diabetes ([facility] E11.9    Controlled type 2 diabetes mellitus without complication, without long-term current use of insulin ([facility]) E11.9    Pulmonary nodules R91.8    Hydradenitis L73.2    Mixed hyperlipidemia E78.2     Patient Active Problem List    Diagnosis Date Noted    Mixed hyperlipidemia 10/24/2019    Hydradenitis 10/15/2019    Diabetes ([facility] 05/03/2019    Pulmonary nodules 11/20/2018    Controlled type 2 diabetes mellitus without complication, without long-term current use of insulin [facility] 10/12/2018    Erectile dysfunction 11/08/2017     Current Outpatient Medications   Medication Sig Dispense Refill    atorvastatin (LIPITOR) 20 mg tablet TAKE 1 TABLET BY MOUTH EVERY DAY 90 Tab 0    metFORMIN (GLUCOPHAGE) 1,000 mg tablet TAKE 1 TABLET BY MOUTH TWICE A DAY WITH MEALS 180 Tab 0    Blood-Glucose Meter monitoring kit Check blood sugar daily in the morning before eating 1 Kit 0    glucose blood VI test strips (ASCENSIA AUTODISC VI, ONE TOUCH ULTRA TEST VI) strip Check blood sugar daily in the morning before eating 50 Strip 6    lancets misc Check glucose daily in morning before eating 100 Each 11     Allergies   Allergen Reactions    Chlorpheniramine-Phenylpropan Runny Nose    Pollen Extracts Sneezing     Past Medical History:   Diagnosis Date    [facility]      History reviewed.  No pertinent surgical history. Family History   Problem Relation Age of Onset    Diabetes Mother     Hypertension Mother      Social History     Tobacco Use    Smoking status: Light Tobacco Smoker    Smokeless tobacco: Never Used    Tobacco comment: black mild    Substance Use Topics    Alcohol use: Yes     Alcohol/week: 1.0 standard drinks     Types: 1 Cans of beer per week     Comment: occ beer       ROS  As stated in HPI, otherwise all others negative. Objective: There were no vitals taken for this visit. General: alert, cooperative, no distress   Mental  status: normal mood, behavior, speech, dress, motor activity, and thought processes, able to follow commands   HENT: NCAT   Neck: no visualized mass   Resp: no respiratory distress   Neuro: no gross deficits   Skin: no discoloration or lesions of concern on visible areas   Psychiatric: normal affect, consistent with stated mood, no evidence of hallucinations     Additional exam findings: We discussed the expected course, resolution and complications of the diagnosis(es) in detail. Medication risks, benefits, costs, interactions, and alternatives were discussed as indicated. I advised him to contact the office if his condition worsens, changes or fails to improve as anticipated. He expressed understanding with the diagnosis(es) and plan. Jameel English is a 45 y.o. male who was evaluated by a video visit encounter for concerns as above. Patient identification was verified prior to start of the visit. A caregiver was present when appropriate. Due to this being a TeleHealth encounter (During [facility]- public health emergency), evaluation of the following organ systems was limited: Vitals/Constitutional/EENT/Resp/CV/GI//MS/Neuro/Skin/Heme-Lymph-Imm.   Pursuant to the emergency declaration under the [address] [place] [address], [address] [place] [address] and the [employer] and [employer] Act, this Virtual Visit was conducted, with patient's (and/or legal guardian's) consent, to reduce the patient's risk of exposure to COVID-19 and provide necessary medical care. Services were provided through a video synchronous discussion virtually to substitute for in-person clinic visit. Patient and provider were located at their individual homes. An After Visit Summary was printed and given to the patient. All diagnosis have been discussed with the patient and all of the patient's questions have been answered. Follow-up and Dispositions    · Return for asap labs  AND 3 months , DM, DM foot, HLD, office only. Mariia Landa, [place] [address] [address]   [address]auricio Layne

## 2020-07-10 NOTE — PROGRESS NOTES
Chief Complaint   Patient presents with    Other     Sweat glands     Medication Refill           1. Have you been to the ER, urgent care clinic since your last visit? Hospitalized since your last visit? NO    2. Have you seen or consulted any other health care providers outside of the [address] [place] [address] since your last visit? Include any pap smears or colon screening.  NO

## 2020-07-17 LAB
ALBUMIN SERPL-MCNC: 4.4 G/DL (ref 4–5)
ALBUMIN/CREAT UR: <8 MG/G CREAT (ref 0–29)
ALBUMIN/GLOB SERPL: 1.9 {RATIO} (ref 1.2–2.2)
ALP SERPL-CCNC: 62 IU/L (ref 39–117)
ALT SERPL-CCNC: 38 IU/L (ref 0–44)
AST SERPL-CCNC: 27 IU/L (ref 0–40)
BILIRUB SERPL-MCNC: 0.2 MG/DL (ref 0–1.2)
BUN SERPL-MCNC: 11 MG/DL (ref 6–24)
BUN/CREAT SERPL: 12 (ref 9–20)
CALCIUM SERPL-MCNC: 9 MG/DL (ref 8.7–10.2)
CHLORIDE SERPL-SCNC: 104 MMOL/L (ref 96–106)
CHOLEST SERPL-MCNC: 125 MG/DL (ref 100–199)
CO2 SERPL-SCNC: 26 MMOL/L (ref 20–29)
CREAT SERPL-MCNC: 0.94 MG/DL (ref 0.76–1.27)
CREAT UR-MCNC: 35.9 MG/DL
EST. AVERAGE GLUCOSE BLD GHB EST-MCNC: 137 MG/DL
GLOBULIN SER CALC-MCNC: 2.3 G/DL (ref 1.5–4.5)
GLUCOSE SERPL-MCNC: 118 MG/DL (ref 65–99)
HBA1C MFR BLD: 6.4 % (ref 4.8–5.6)
HDLC SERPL-MCNC: 35 MG/DL
INTERPRETATION, 910389: NORMAL
LDLC SERPL CALC-MCNC: 73 MG/DL (ref 0–99)
Lab: NORMAL
MICROALBUMIN UR-MCNC: <3 UG/ML
POTASSIUM SERPL-SCNC: 4.4 MMOL/L (ref 3.5–5.2)
PROT SERPL-MCNC: 6.7 G/DL (ref 6–8.5)
SODIUM SERPL-SCNC: 143 MMOL/L (ref 134–144)
TRIGL SERPL-MCNC: 87 MG/DL (ref 0–149)
VLDLC SERPL CALC-MCNC: 17 MG/DL (ref 5–40)

## 2020-09-15 ENCOUNTER — TELEPHONE (OUTPATIENT)
Dept: FAMILY MEDICINE CLINIC | Age: 45
End: 2020-09-15

## 2020-09-15 DIAGNOSIS — E11.9 CONTROLLED TYPE 2 DIABETES MELLITUS WITHOUT COMPLICATION, WITHOUT LONG-TERM CURRENT USE OF INSULIN (HCC): ICD-10-CM

## 2020-09-15 RX ORDER — METFORMIN HYDROCHLORIDE 1000 MG/1
TABLET ORAL
Qty: 180 TAB | Refills: 0 | Status: SHIPPED | OUTPATIENT
Start: 2020-09-15 | End: 2021-01-05 | Stop reason: SDUPTHER

## 2020-09-15 NOTE — TELEPHONE ENCOUNTER
Called pt and informed him that will submit and discuss further at appt with TERRI Coyle on 10/9 at 11AM. Thank you

## 2020-10-09 ENCOUNTER — HOSPITAL ENCOUNTER (OUTPATIENT)
Dept: LAB | Age: 45
Discharge: HOME | End: 2020-10-09

## 2020-10-09 ENCOUNTER — OFFICE VISIT (OUTPATIENT)
Dept: FAMILY MEDICINE CLINIC | Age: 45
End: 2020-10-09
Payer: MEDICAID

## 2020-10-09 VITALS
DIASTOLIC BLOOD PRESSURE: 84 MMHG | SYSTOLIC BLOOD PRESSURE: 131 MMHG | WEIGHT: 265 LBS | HEART RATE: 96 BPM | OXYGEN SATURATION: 100 % | HEIGHT: 78 IN | TEMPERATURE: 99.3 F | BODY MASS INDEX: 30.66 KG/M2 | RESPIRATION RATE: 14 BRPM

## 2020-10-09 DIAGNOSIS — M54.50 CHRONIC LEFT-SIDED LOW BACK PAIN WITHOUT SCIATICA: ICD-10-CM

## 2020-10-09 DIAGNOSIS — G89.29 CHRONIC LEFT-SIDED LOW BACK PAIN WITHOUT SCIATICA: ICD-10-CM

## 2020-10-09 DIAGNOSIS — E11.9 CONTROLLED TYPE 2 DIABETES MELLITUS WITHOUT COMPLICATION, WITHOUT LONG-TERM CURRENT USE OF INSULIN (HCC): Primary | ICD-10-CM

## 2020-10-09 DIAGNOSIS — R91.8 PULMONARY NODULES: ICD-10-CM

## 2020-10-09 DIAGNOSIS — E78.2 MIXED HYPERLIPIDEMIA: ICD-10-CM

## 2020-10-09 LAB — XX-LABCORP SPECIMEN COL,LCBCF: NORMAL

## 2020-10-09 PROCEDURE — 99214 OFFICE O/P EST MOD 30 MIN: CPT | Performed by: NURSE PRACTITIONER

## 2020-10-09 PROCEDURE — 99001 SPECIMEN HANDLING PT-LAB: CPT

## 2020-10-09 RX ORDER — CYCLOBENZAPRINE HCL 5 MG
5 TABLET ORAL
Qty: 30 TAB | Refills: 0 | Status: SHIPPED | OUTPATIENT
Start: 2020-10-09 | End: 2021-01-05 | Stop reason: SDUPTHER

## 2020-10-09 RX ORDER — NAPROXEN 500 MG/1
500 TABLET ORAL 2 TIMES DAILY WITH MEALS
Qty: 60 TAB | Refills: 0 | Status: SHIPPED | OUTPATIENT
Start: 2020-10-09 | End: 2021-01-05 | Stop reason: SDUPTHER

## 2020-10-09 NOTE — PROGRESS NOTES
LeonardomoMauricio White               [phone]      Yonatan Martinez is a 45 y.o. male and presents with Diabetes and Cholesterol Problem       Assessment/Plan:    Diagnoses and all orders for this visit:    1. Controlled type 2 diabetes mellitus without complication, without long-term current use of insulin (HCC)  -     REFERRAL TO OPHTHALMOLOGY  -      DIABETES FOOT EXAM  -     MICROALBUMIN, UR, RAND W/ MICROALB/CREAT RATIO; Future   Health maintenance  Too soon to repeat labs  Lab Results   Component Value Date/Time    Hemoglobin A1c 6.4 (H) 07/16/2020 11:11 AM    Hemoglobin A1c (POC) 6.4 05/03/2019 04:38 PM   diabetes stable    2. Mixed hyperlipidemia  Lipids stable  Continue same medications    3. Pulmonary nodules  -     XR CHEST PA LAT; Future  Not sure when the nodule was found  Will get CXR    4. Chronic left-sided low back pain without sciatica  -     REFERRAL TO SPORTS MEDICINE  -     cyclobenzaprine (FLEXERIL) 5 mg tablet; Take 1 Tab by mouth three (3) times daily as needed for Muscle Spasm(s). -     naproxen (NAPROSYN) 500 mg tablet; Take 1 Tab by mouth two (2) times daily (with meals). Refer to ortho  nsaids and muscle relaxers  Hand out on stretches and exercises provided with AVS    Other orders  -     MICROALBUMIN, UR, RAND W/ MICROALB/CREAT RATIO        Follow-up and Dispositions    · Return in about 4 months (around 2/9/2021) for DM, HTN, 15 min, office only. Subjective:    DMII-   Patient reports medication compliance Daily  Diabetic diet compliance most of the time  Patient monitors blood sugars regularly never   Reports am fasting sugars range does not check   Denies hypoglycemic episodes no  Denies polyuria, polydipsia, paraesthesia, vision changes?  yes     Diabetic Foot and Eye Exam  Status   Topic Date Due    Eye Exam  11/01/1987    Diabetic Foot Care  10/09/2021     Hemoglobin A1c   Date Value Ref Range Status   07/16/2020 6.4 (H) 4.8 - 5.6 % Final     Comment:              Prediabetes: 5.7 - 6.4           Diabetes: >6.4           Glycemic control for adults with diabetes: <7.0     ]  Creatinine, urine   Date Value Ref Range Status   10/09/2020 169.9 Not Estab. mg/dL Final     Microalb/Creat ratio (ug/mg creat.)   Date Value Ref Range Status   10/09/2020 6 0 - 29 mg/g creat Final     Comment:                            Normal:                0 -  29                         Moderately increased: 30 - 300                         Severely increased:       >300                **Please note reference interval change**     07/16/2020 <8 0 - 29 mg/g creat Final     Comment:                            Normal:                0 -  29                         Moderately increased: 30 - 300                         Severely increased:       >300                **Please note reference interval change**       Key Antihyperglycemic Medications             metFORMIN (GLUCOPHAGE) 1,000 mg tablet (Taking) TAKE 1 TABLET BY MOUTH TWICE A DAY WITH MEALS        HLD:  Has been compliant with meds  all of the time  Compliant with low-fat diet. most of the time    Denies myalgias or other side effects.  yes  Cholesterol, total   Date Value Ref Range Status   07/16/2020 125 100 - 199 mg/dL Final     Triglyceride   Date Value Ref Range Status   07/16/2020 87 0 - 149 mg/dL Final     HDL Cholesterol   Date Value Ref Range Status   07/16/2020 35 (L) >39 mg/dL Final     LDL, calculated   Date Value Ref Range Status   07/16/2020 73 0 - 99 mg/dL Final   ]  Key Antihyperlipidemia Meds             atorvastatin (LIPITOR) 20 mg tablet (Taking) TAKE 1 TABLET BY MOUTH EVERY DAY         Back pain  Is a   Has low back pain, left side only  Has had this for 20+ years  Treatments: ibuprofen, icy hot, stretches and exercises, hot water  Characteristics: the pain is tearing and throbbing, no radiation, happens after sitting for 8 hours or more, does not use a back brace, bending wrong will bring on the pain, once the pain starts it will last 1-2 days    ROS:     ROS  As stated in HPI, otherwise all others negative. The problem list was updated as a part of today's visit. Patient Active Problem List   Diagnosis Code    Erectile dysfunction N52.9    Controlled type 2 diabetes mellitus without complication, without long-term current use of insulin ([facility] [address] E11.9    Pulmonary nodules R91.8    Hydradenitis L73.2    Mixed hyperlipidemia E78.2       The PSH, FH were reviewed. SH:  Social History     Tobacco Use    Smoking status: Light Tobacco Smoker    Smokeless tobacco: Never Used    Tobacco comment: black mild    Substance Use Topics    Alcohol use: Yes     Alcohol/week: 1.0 standard drinks     Types: 1 Cans of beer per week     Comment: occ beer    Drug use: Yes     Types: Marijuana     Comment: occ       Medications/Allergies:  Current Outpatient Medications on File Prior to Visit   Medication Sig Dispense Refill    metFORMIN (GLUCOPHAGE) 1,000 mg tablet TAKE 1 TABLET BY MOUTH TWICE A DAY WITH MEALS 180 Tab 0    atorvastatin (LIPITOR) 20 mg tablet TAKE 1 TABLET BY MOUTH EVERY DAY 90 Tab 0    Blood-Glucose Meter monitoring kit Check blood sugar daily in the morning before eating 1 Kit 0    glucose blood VI test strips (ASCENSIA AUTODISC VI, ONE TOUCH ULTRA TEST VI) strip Check blood sugar daily in the morning before eating 50 Strip 6    lancets misc Check glucose daily in morning before eating 100 Each 11     No current facility-administered medications on file prior to visit. Allergies   Allergen Reactions    Chlorpheniramine-Phenylpropan Runny Nose    Pollen Extracts Sneezing       Objective:  Visit Vitals  /84   Pulse 96   Temp 99.3 °F (37.4 °C) (Oral)   Resp 14   Ht 6' 8\" (2.032 m)   Wt 265 lb (120.2 kg)   SpO2 100%   BMI 29.11 kg/m²    Body mass index is 29.11 kg/m². Physical assessment  Physical Exam  Vitals signs and nursing note reviewed. Eyes:      Conjunctiva/sclera: Conjunctivae normal.      Pupils: Pupils are equal, round, and reactive to light. Neck:      Musculoskeletal: Normal range of motion. Vascular: No JVD. Cardiovascular:      Rate and Rhythm: Normal rate and regular rhythm. Heart sounds: Normal heart sounds. No murmur. No friction rub. No gallop. Pulmonary:      Effort: Pulmonary effort is normal.      Breath sounds: Normal breath sounds. Musculoskeletal: Normal range of motion. Feet:      Comments: Diabetic foot exam:     Left: Reflexes 1+     Vibratory sensation normal    Proprioception normal   Filament test normal sensation with micro filament   Pulse DP: 2+ (normal)   Deformities: None  Right: Reflexes 1+   Vibratory sensation normal   Proprioception normal   Filament test normal sensation with micro filament   Pulse DP: 2+ (normal)   Deformities: None  Skin:     General: Skin is warm and dry. Neurological:      Mental Status: He is alert and oriented to person, place, and time.    Psychiatric:         Mood and Affect: Affect normal.         Cognition and Memory: Memory normal.         Judgment: Judgment normal.           Labwork and Ancillary Studies:    CBC w/Diff  Lab Results   Component Value Date/Time    WBC 6.6 05/04/2019 12:00 AM    HGB 13.0 05/04/2019 12:00 AM    PLATELET 105 47/29/8941 12:00 AM         Basic Metabolic Profile  Lab Results   Component Value Date/Time    Sodium 143 07/16/2020 11:11 AM    Potassium 4.4 07/16/2020 11:11 AM    Chloride 104 07/16/2020 11:11 AM    CO2 26 07/16/2020 11:11 AM    Glucose 118 (H) 07/16/2020 11:11 AM    BUN 11 07/16/2020 11:11 AM    Creatinine 0.94 07/16/2020 11:11 AM    BUN/Creatinine ratio 12 07/16/2020 11:11 AM    GFR est  07/16/2020 11:11 AM    GFR est non- 07/16/2020 11:11 AM    Calcium 9.0 07/16/2020 11:11 AM        Cholesterol  Lab Results   Component Value Date/Time    Cholesterol, total 125 07/16/2020 11:11 AM    HDL Cholesterol 35 (L) 07/16/2020 11:11 AM    LDL, calculated 73 07/16/2020 11:11 AM    Triglyceride 87 07/16/2020 11:11 AM       Health Maintenance:   Health Maintenance   Topic Date Due    Pneumococcal 0-64 years (1 of 1 - PPSV23) 11/01/1983    Eye Exam Retinal or Dilated  11/01/1987    DTaP/Tdap/Td series (1 - Tdap) 11/01/1998    Flu Vaccine (1) 09/01/2020    A1C test (Diabetic or Prediabetic)  07/16/2021    Lipid Screen  07/16/2021    Foot Exam Q1  10/09/2021    MICROALBUMIN Q1  10/09/2021       I have discussed the diagnosis with the patient and the intended plan as seen in the above orders. The patient has received an After-Visit Summary and questions were answered concerning future plans. An After Visit Summary was printed and given to the patient. All diagnosis have been discussed with the patient and all of the patient's questions have been answered. Follow-up and Dispositions    · Return in about 4 months (around 2/9/2021) for DM, HTN, 15 min, office only. Anselmo Aguilar, [facility]  [address] [facility]   [address] [facility] [address]  [id number]

## 2020-10-09 NOTE — PROGRESS NOTES
Chief Complaint   Patient presents with    Diabetes    Cholesterol Problem         1. Have you been to the ER, urgent care clinic since your last visit? Hospitalized since your last visit? NO    2. Have you seen or consulted any other health care providers outside of the [address] [place] [address] since your last visit? Include any pap smears or colon screening.  NO

## 2020-10-09 NOTE — PATIENT INSTRUCTIONS
Try aspercreme or voltaren topical pain reliever Low Back Pain: Exercises Introduction Here are some examples of exercises for you to try. The exercises may be suggested for a condition or for rehabilitation. Start each exercise slowly. Ease off the exercises if you start to have pain. You will be told when to start these exercises and which ones will work best for you. How to do the exercises Press-up 1. Lie on your stomach, supporting your body with your forearms. 2. Press your elbows down into the floor to raise your upper back. As you do this, relax your stomach muscles and allow your back to arch without using your back muscles. As your press up, do not let your hips or pelvis come off the floor. 3. Hold for 15 to 30 seconds, then relax. 4. Repeat 2 to 4 times. Alternate arm and leg (bird dog) exercise Do this exercise slowly. Try to keep your body straight at all times, and do not let one hip drop lower than the other. 1. Start on the floor, on your hands and knees. 2. Tighten your belly muscles. 3. Raise one leg off the floor, and hold it straight out behind you. Be careful not to let your hip drop down, because that will twist your trunk. 4. Hold for about 6 seconds, then lower your leg and switch to the other leg. 5. Repeat 8 to 12 times on each leg. 6. Over time, work up to holding for 10 to 30 seconds each time. 7. If you feel stable and secure with your leg raised, try raising the opposite arm straight out in front of you at the same time. Knee-to-chest exercise 1. Lie on your back with your knees bent and your feet flat on the floor. 2. Bring one knee to your chest, keeping the other foot flat on the floor (or keeping the other leg straight, whichever feels better on your lower back). 3. Keep your lower back pressed to the floor. Hold for at least 15 to 30 seconds. 4. Relax, and lower the knee to the starting position. 5. Repeat with the other leg. Repeat 2 to 4 times with each leg. 6. To get more stretch, put your other leg flat on the floor while pulling your knee to your chest.   
Curl-ups 1. Lie on the floor on your back with your knees bent at a 90-degree angle. Your feet should be flat on the floor, about 12 inches from your buttocks. 2. Cross your arms over your chest. If this bothers your neck, try putting your hands behind your neck (not your head), with your elbows spread apart. 3. Slowly tighten your belly muscles and raise your shoulder blades off the floor. 4. Keep your head in line with your body, and do not press your chin to your chest. 
5. Hold this position for 1 or 2 seconds, then slowly lower yourself back down to the floor. 6. Repeat 8 to 12 times. Pelvic tilt exercise 1. Lie on your back with your knees bent. 2. \"Brace\" your stomach. This means to tighten your muscles by pulling in and imagining your belly button moving toward your spine. You should feel like your back is pressing to the floor and your hips and pelvis are rocking back. 3. Hold for about 6 seconds while you breathe smoothly. 4. Repeat 8 to 12 times. Heel dig bridging 1. Lie on your back with both knees bent and your ankles bent so that only your heels are digging into the floor. Your knees should be bent about 90 degrees. 2. Then push your heels into the floor, squeeze your buttocks, and lift your hips off the floor until your shoulders, hips, and knees are all in a straight line. 3. Hold for about 6 seconds as you continue to breathe normally, and then slowly lower your hips back down to the floor and rest for up to 10 seconds. 4. Do 8 to 12 repetitions. Hamstring stretch in doorway 1. Lie on your back in a doorway, with one leg through the open door. 2. Slide your leg up the wall to straighten your knee. You should feel a gentle stretch down the back of your leg. 3. Hold the stretch for at least 15 to 30 seconds. Do not arch your back, point your toes, or bend either knee. Keep one heel touching the floor and the other heel touching the wall. 4. Repeat with your other leg. 5. Do 2 to 4 times for each leg. Hip flexor stretch 1. Kneel on the floor with one knee bent and one leg behind you. Place your forward knee over your foot. Keep your other knee touching the floor. 2. Slowly push your hips forward until you feel a stretch in the upper thigh of your rear leg. 3. Hold the stretch for at least 15 to 30 seconds. Repeat with your other leg. 4. Do 2 to 4 times on each side. Wall sit 1. Stand with your back 10 to 12 inches away from a wall. 2. Lean into the wall until your back is flat against it. 3. Slowly slide down until your knees are slightly bent, pressing your lower back into the wall. 4. Hold for about 6 seconds, then slide back up the wall. 5. Repeat 8 to 12 times. Follow-up care is a key part of your treatment and safety. Be sure to make and go to all appointments, and call your doctor if you are having problems. It's also a good idea to know your test results and keep a list of the medicines you take. Where can you learn more? Go to http://www.gray.com/ Enter J120 in the search box to learn more about \"Low Back Pain: Exercises. \" Current as of: March 2, 2020               Content Version: 12.6 © 2006-2020 [employer]. Care instructions adapted under license by [employer] (which disclaims liability or warranty for this information). If you have questions about a medical condition or this instruction, always ask your healthcare professional. [place] [address] any warranty or liability for your use of this information.

## 2020-10-10 LAB
ALBUMIN/CREAT UR: 6 MG/G CREAT (ref 0–29)
CREAT UR-MCNC: 169.9 MG/DL
MICROALBUMIN UR-MCNC: 9.9 UG/ML

## 2021-01-05 DIAGNOSIS — M54.50 CHRONIC LEFT-SIDED LOW BACK PAIN WITHOUT SCIATICA: ICD-10-CM

## 2021-01-05 DIAGNOSIS — E11.9 CONTROLLED TYPE 2 DIABETES MELLITUS WITHOUT COMPLICATION, WITHOUT LONG-TERM CURRENT USE OF INSULIN (HCC): ICD-10-CM

## 2021-01-05 DIAGNOSIS — E78.2 MIXED HYPERLIPIDEMIA: ICD-10-CM

## 2021-01-05 DIAGNOSIS — G89.29 CHRONIC LEFT-SIDED LOW BACK PAIN WITHOUT SCIATICA: ICD-10-CM

## 2021-01-05 RX ORDER — METFORMIN HYDROCHLORIDE 1000 MG/1
TABLET ORAL
Qty: 180 TAB | Refills: 0 | Status: SHIPPED | OUTPATIENT
Start: 2021-01-05 | End: 2021-02-18 | Stop reason: SDUPTHER

## 2021-01-05 RX ORDER — CYCLOBENZAPRINE HCL 5 MG
5 TABLET ORAL
Qty: 30 TAB | Refills: 0 | Status: SHIPPED | OUTPATIENT
Start: 2021-01-05 | End: 2021-07-27 | Stop reason: SDUPTHER

## 2021-01-05 RX ORDER — NAPROXEN 500 MG/1
500 TABLET ORAL 2 TIMES DAILY WITH MEALS
Qty: 60 TAB | Refills: 0 | Status: SHIPPED | OUTPATIENT
Start: 2021-01-05 | End: 2021-07-15

## 2021-01-05 RX ORDER — ATORVASTATIN CALCIUM 20 MG/1
TABLET, FILM COATED ORAL
Qty: 90 TAB | Refills: 0 | Status: SHIPPED | OUTPATIENT
Start: 2021-01-05 | End: 2021-02-18 | Stop reason: SDUPTHER

## 2021-02-18 DIAGNOSIS — E11.9 CONTROLLED TYPE 2 DIABETES MELLITUS WITHOUT COMPLICATION, WITHOUT LONG-TERM CURRENT USE OF INSULIN (HCC): ICD-10-CM

## 2021-02-18 DIAGNOSIS — E78.2 MIXED HYPERLIPIDEMIA: ICD-10-CM

## 2021-02-18 RX ORDER — ATORVASTATIN CALCIUM 20 MG/1
TABLET, FILM COATED ORAL
Qty: 90 TAB | Refills: 0 | Status: SHIPPED | OUTPATIENT
Start: 2021-02-18 | End: 2021-05-11 | Stop reason: SDUPTHER

## 2021-02-18 RX ORDER — METFORMIN HYDROCHLORIDE 1000 MG/1
TABLET ORAL
Qty: 180 TAB | Refills: 0 | Status: SHIPPED | OUTPATIENT
Start: 2021-02-18 | End: 2021-05-11 | Stop reason: SDUPTHER

## 2021-05-11 ENCOUNTER — OFFICE VISIT (OUTPATIENT)
Dept: FAMILY MEDICINE CLINIC | Age: 46
End: 2021-05-11
Payer: MEDICAID

## 2021-05-11 VITALS
HEIGHT: 78 IN | TEMPERATURE: 97.9 F | SYSTOLIC BLOOD PRESSURE: 115 MMHG | RESPIRATION RATE: 16 BRPM | WEIGHT: 269 LBS | BODY MASS INDEX: 31.12 KG/M2 | HEART RATE: 97 BPM | DIASTOLIC BLOOD PRESSURE: 68 MMHG | OXYGEN SATURATION: 97 %

## 2021-05-11 DIAGNOSIS — Z11.59 NEED FOR HEPATITIS C SCREENING TEST: ICD-10-CM

## 2021-05-11 DIAGNOSIS — Z11.3 SCREEN FOR STD (SEXUALLY TRANSMITTED DISEASE): ICD-10-CM

## 2021-05-11 DIAGNOSIS — L73.2 HYDRADENITIS: ICD-10-CM

## 2021-05-11 DIAGNOSIS — E11.9 CONTROLLED TYPE 2 DIABETES MELLITUS WITHOUT COMPLICATION, WITHOUT LONG-TERM CURRENT USE OF INSULIN (HCC): Primary | ICD-10-CM

## 2021-05-11 DIAGNOSIS — E78.2 MIXED HYPERLIPIDEMIA: ICD-10-CM

## 2021-05-11 DIAGNOSIS — R91.8 PULMONARY NODULES: ICD-10-CM

## 2021-05-11 PROCEDURE — 99214 OFFICE O/P EST MOD 30 MIN: CPT | Performed by: NURSE PRACTITIONER

## 2021-05-11 RX ORDER — ATORVASTATIN CALCIUM 20 MG/1
TABLET, FILM COATED ORAL
Qty: 90 TAB | Refills: 0 | Status: SHIPPED | OUTPATIENT
Start: 2021-05-11 | End: 2022-04-05 | Stop reason: SDUPTHER

## 2021-05-11 RX ORDER — METFORMIN HYDROCHLORIDE 1000 MG/1
TABLET ORAL
Qty: 180 TAB | Refills: 0 | Status: SHIPPED | OUTPATIENT
Start: 2021-05-11 | End: 2021-07-15 | Stop reason: SDUPTHER

## 2021-05-11 RX ORDER — DOXYCYCLINE 100 MG/1
100 CAPSULE ORAL 2 TIMES DAILY
Qty: 20 CAP | Refills: 0 | Status: SHIPPED | OUTPATIENT
Start: 2021-05-11 | End: 2021-05-21

## 2021-05-11 NOTE — PATIENT INSTRUCTIONS
What is the glycemic index? For a person with diabetes, one way to select safe and suitable fruits and other high-carbohydrate foods is to check the glycemic index (GI). GI is a rating of foods on a scale from 1 to 100. The score indicates how quickly the food item may raise blood sugar levels. High GI foods are absorbed faster than medium or low GI foods. Glycemic load (GL) takes into account the GI of a food plus the number of carbohydrates in a serving. GL may be a more accurate way of assessing how food affects blood sugar management over time. Low-GI and low-GL foods are better for helping control blood sugar levels. People may be surprised to learn that many fruits have a low glycemic index. People digest starchy vegetables, such as potatoes and grains, more rapidly, so these have a higher GI index. The longer a carbohydrate-rich food is cooked, the higher the GI value. Fat, fiber content, and cooling carbohydrates after they have been transformed into resistant starches via cooking can all dramatically lower GI values. Low GI and GL fruits Some fruits have a GI of under 55 and a GL under 10, including.  apples  avocados  bananas  berries  cherries  grapes  kiwi fruit  nectarines  orange  peaches  pears  plums  strawberries Medium-GI fruits (GI of 56 to 69) A fruit with a GI of between 56 and 69 is considered to be a medium-GI food. All fruits listed below still have GL levels under 10. 
 honeydew melon  figs  papayas  pineapples High-GI fruits Fruits with a GI higher than 70 are high-GI and a GL of greater than 20 is high GL. While these are safe to eat with diabetes, it is important to eat larger quantities of lower-GI fruits instead.  dates (high GL)  watermelon (low GL)  Benefits for diabetes  Fruit plays a key role in helping people with diabetes feel full and absorb sugar slowly.  Eating enough fiber plays an important role in managing diabetes.  A diet high in soluble fiber can slow the absorption of sugar and control its levels in the blood. Many fruits are high in fiber, especially those with the skin or pulp included.  Many fruits are filling because of their high fiber and water content.  Diets containing enough fruits and vegetables can reduce the risk of obesity, heart attack, and stroke. Obesity has been linked to type 2 diabetes.  Fruits are high in fiber and nutrients, so they are a good choice in meal planning. Fruits that have been processed such as applesauce and fruit juices have had their fiber removed and should be limited. Learning About Meal Planning for Diabetes Why plan your meals? Meal planning can be a key part of managing diabetes. Planning meals and snacks with the right balance of carbohydrate, protein, and fat can help you keep your blood sugar at the target level you set with your doctor. You don't have to eat special foods. You can eat what your family eats, including sweets once in a while. But you do have to pay attention to how often you eat and how much you eat of certain foods. You may want to work with a dietitian or a certified diabetes educator. He or she can give you tips and meal ideas and can answer your questions about meal planning. This health professional can also help you reach a healthy weight if that is one of your goals. What plan is right for you? Your dietitian or diabetes educator may suggest that you start with the plate format or carbohydrate counting. The plate format The plate format is a simple way to help you manage how you eat. You plan meals by learning how much space each food should take on a plate. Using the plate format helps you spread carbohydrate throughout the day. It can make it easier to keep your blood sugar level within your target range. It also helps you see if you're eating healthy portion sizes.  
To use the plate format, you put non-starchy vegetables on half your plate. Add meat or meat substitutes on one-quarter of the plate. Put a grain or starchy vegetable (such as brown rice or a potato) on the final quarter of the plate. You can add a small piece of fruit and some low-fat or fat-free milk or yogurt, depending on your carbohydrate goal for each meal. 
Here are some tips for using the plate format: · Make sure that you are not using an oversized plate. A 9-inch plate is best. Many restaurants use larger plates. · Get used to using the plate format at home. Then you can use it when you eat out. · Write down your questions about using the plate format. Talk to your doctor, a dietitian, or a diabetes educator about your concerns. Carbohydrate counting With carbohydrate counting, you plan meals based on the amount of carbohydrate in each food. Carbohydrate raises blood sugar higher and more quickly than any other nutrient. It is found in desserts, breads and cereals, and fruit. It's also found in starchy vegetables such as potatoes and corn, grains such as rice and pasta, and milk and yogurt. Spreading carbohydrate throughout the day helps keep your blood sugar levels within your target range. Your daily amount depends on several things, including your weight, how active you are, which diabetes medicines you take, and what your goals are for your blood sugar levels. A registered dietitian or diabetes educator can help you plan how much carbohydrate to include in each meal and snack. A guideline for your daily amount of carbohydrate is: · 45 to 60 grams at each meal. That's about the same as 3 to 4 carbohydrate servings. · 15 to 20 grams at each snack. That's about the same as 1 carbohydrate serving. The Nutrition Facts label on packaged foods tells you how much carbohydrate is in a serving of the food. First, look at the serving size on the food label. Is that the amount you eat in a serving?  All of the nutrition information on a food label is based on that serving size. So if you eat more or less than that, you'll need to adjust the other numbers. Total carbohydrate is the next thing you need to look for on the label. If you count carbohydrate servings, one serving of carbohydrate is 15 grams. For foods that don't come with labels, such as fresh fruits and vegetables, you'll need a guide that lists carbohydrate in these foods. Ask your doctor, dietitian, or diabetes educator about books or other nutrition guides you can use. If you take insulin, you need to know how many grams of carbohydrate are in a meal. This lets you know how much rapid-acting insulin to take before you eat. If you use an insulin pump, you get a constant rate of insulin during the day. So the pump must be programmed at meals to give you extra insulin to cover the rise in blood sugar after meals. When you know how much carbohydrate you will eat, you can take the right amount of insulin. Or, if you always use the same amount of insulin, you need to make sure that you eat the same amount of carbohydrate at meals. If you need more help to understand carbohydrate counting and food labels, ask your doctor, dietitian, or diabetes educator. How can you plan healthy meals? Here are some tips to get started: 
· Plan your meals a week at a time. Don't forget to include snacks too. · Use cookbooks or online recipes to plan several main meals. Plan some quick meals for busy nights. You also can double some recipes that freeze well. Then you can save half for other busy nights when you don't have time to cook. · Make sure you have the ingredients you need for your recipes. If you're running low on basic items, put these items on your shopping list too. · List foods that you use to make breakfasts, lunches, and snacks. List plenty of fruits and vegetables. · Post this list on the refrigerator. Add to it as you think of more things you need. · Take the list to the store to do your weekly shopping. Follow-up care is a key part of your treatment and safety. Be sure to make and go to all appointments, and call your doctor if you are having problems. It's also a good idea to know your test results and keep a list of the medicines you take. Where can you learn more? Go to http://www.[employer]/ Javier Beard in the search box to learn more about \"Learning About Meal Planning for Diabetes. \" Current as of: August 31, 2020               Content Version: 12.8 © 2006-2021 [employer]. Care instructions adapted under license by [employer] (which disclaims liability or warranty for this information). If you have questions about a medical condition or this instruction, always ask your healthcare professional. [address] any warranty or liability for your use of this information.

## 2021-05-11 NOTE — PROGRESS NOTES
Chief Complaint   Patient presents with    Diabetes       Pt preferred language for health care discussion is english. Is someone accompanying this pt? no    Is the patient using any DME equipment during [address]? no    Depression Screening:  3 most recent PHQ Screens 5/11/2021 10/9/2020 7/10/2020 5/3/2019   Little interest or pleasure in doing things Not at all Not at all Not at all Not at all   Feeling down, depressed, irritable, or hopeless Not at all Not at all Not at all Not at all   Total Score PHQ 2 0 0 0 0       Learning Assessment:  Learning Assessment 5/3/2019   PRIMARY LEARNER Patient   HIGHEST LEVEL OF EDUCATION - PRIMARY LEARNER  GRADUATED HIGH SCHOOL OR GED   BARRIERS PRIMARY LEARNER NONE   CO-LEARNER CAREGIVER No   PRIMARY LANGUAGE ENGLISH   LEARNER PREFERENCE PRIMARY DEMONSTRATION   ANSWERED BY self    RELATIONSHIP SELF         Health Maintenance reviewed and discussed per provider. Yes        Advance Directive:  1. Do you have an advance directive in place? Patient Reply:no    2. If not, would you like material regarding how to put one in place? Patient Reply: no    Coordination of Care:  1. Have you been to the ER, urgent care clinic since your last visit? Hospitalized since your last visit? no    2. Have you seen or consulted any other health care providers outside of the [address] [place] [address] since your last visit? Include any pap smears or colon screening.  no    Provider prefers to do his own med reconciliation

## 2021-05-11 NOTE — PROGRESS NOTES
[address] [place] [address]               [phone]      Otf Stover is a 39 y.o. male and presents with Diabetes       Assessment/Plan:    Diagnoses and all orders for this visit:    1. Controlled type 2 diabetes mellitus without complication, without long-term current use of insulin (HCC)  -     METABOLIC PANEL, COMPREHENSIVE; Future  -     HEMOGLOBIN A1C WITH EAG; Future  -     metFORMIN (GLUCOPHAGE) 1,000 mg tablet; TAKE 1 TABLET BY MOUTH TWICE A DAY WITH MEALS  Endorses medication compliance, Refill provided, Follow-up labs today and Denies signs and symptoms of hyper or hypoglycemia  2. Mixed hyperlipidemia  -     LIPID PANEL; Future  -     atorvastatin (LIPITOR) 20 mg tablet; TAKE 1 TABLET BY MOUTH EVERY DAY  Endorses medication compliance, Refill provided, Follow-up labs today and Denies abdominal pain or symptoms of myalgia  3. Pulmonary nodules  -     CT CHEST W WO CONT; Future  Did not get the follow-up chest CT ordered in 2020, he states he thinks his insurance denied payment, will reorder  4. Hydradenitis  -     REFERRAL TO DERMATOLOGY  -     doxycycline (VIBRAMYCIN) 100 mg capsule; Take 1 Cap by mouth two (2) times a day for 10 days. Repeatedly gets cyst in enterigous areas, this is most likely hydradenitis suppurativa, will treat with doxycycline and refer to dermatology for further evaluation  5. Need for hepatitis C screening test  -     HEPATITIS C AB; Future  Health maintenance needs addressed  6. Screen for STD (sexually transmitted disease)  -     CT/NG/T.VAGINALIS AMPLIFICATION; Future  -     HIV 1/2 AG/AB, 4TH GENERATION,W RFLX CONFIRM;  Future  Recently had an exposure to trichomonas, he personally tested positive however he would like to have repeat testing and HIV testing  Other orders  -     METABOLIC PANEL, COMPREHENSIVE  -     LIPID PANEL  -     CT/NG/T.VAGINALIS AMPLIFICATION  -     CVD REPORT  -     HEMOGLOBIN A1C WITH EAG  -     HEPATITIS C AB  -     HIV 1/2 AG/AB, 4TH GENERATION,W RFLX CONFIRM        Follow-up and Dispositions    · Return in about 3 months (around 8/11/2021) for DM, HLD, 15 min, office only, AND, labs prior. Subjective:    Lung nodule  Follow up CT scan ordered 10/2020  He states he thinks the insurance stated they would not cover it    STD  [facility]  Partner told him she had STD, stated she had trichomonas  His test came back negative  He would like overall STD testing    DMII-   Patient reports medication compliance Daily  Diabetic diet compliance occasionally  Patient monitors blood sugars regularly never   Reports am fasting sugars range does not check   Denies hypoglycemic episodes yes  Denies polyuria, polydipsia, paraesthesia, vision changes?  yes     Diabetic Foot and Eye Exam HM Status   Topic Date Due    Eye Exam  Never done    Diabetic Foot Care  10/12/2021     Hemoglobin A1c   Date Value Ref Range Status   05/11/2021 6.3 (H) 4.8 - 5.6 % Final     Comment:              Prediabetes: 5.7 - 6.4           Diabetes: >6.4           Glycemic control for adults with diabetes: <7.0     ]  Creatinine, urine   Date Value Ref Range Status   10/09/2020 169.9 Not Estab. mg/dL Final     Microalb/Creat ratio (ug/mg creat.)   Date Value Ref Range Status   10/09/2020 6 0 - 29 mg/g creat Final     Comment:                            Normal:                0 -  29                         Moderately increased: 30 - 300                         Severely increased:       >300                **Please note reference interval change**     07/16/2020 <8 0 - 29 mg/g creat Final     Comment:                            Normal:                0 -  29                         Moderately increased: 30 - 300                         Severely increased:       >300                **Please note reference interval change**       Key Antihyperglycemic Medications             metFORMIN (GLUCOPHAGE) 1,000 mg tablet (Taking) TAKE 1 TABLET BY MOUTH TWICE A DAY WITH MEALS        HLD:  Has been compliant with meds  all of the time  Compliant with low-fat diet. most of the time    Denies myalgias or other side effects. yes  Cholesterol, total   Date Value Ref Range Status   05/11/2021 142 100 - 199 mg/dL Final     Triglyceride   Date Value Ref Range Status   05/11/2021 128 0 - 149 mg/dL Final     HDL Cholesterol   Date Value Ref Range Status   05/11/2021 43 >39 mg/dL Final     LDL, calculated   Date Value Ref Range Status   05/11/2021 76 0 - 99 mg/dL Final   ]  Key Antihyperlipidemia Meds             atorvastatin (LIPITOR) 20 mg tablet (Taking) TAKE 1 TABLET BY MOUTH EVERY DAY             ROS:     ROS  As stated in HPI, otherwise all others negative. The problem list was updated as a part of today's visit. Patient Active Problem List   Diagnosis Code    Erectile dysfunction N52.9    Controlled type 2 diabetes mellitus without complication, without long-term current use of insulin ([facility] [address] E11.9    Pulmonary nodules R91.8    Hydradenitis L73.2    Mixed hyperlipidemia E78.2       The PSH, FH were reviewed. SH:  Social History     Tobacco Use    Smoking status: Light Tobacco Smoker    Smokeless tobacco: Never Used    Tobacco comment: black mild    Substance Use Topics    Alcohol use: Yes     Alcohol/week: 1.0 standard drinks     Types: 1 Cans of beer per week     Comment: occ beer    Drug use: Yes     Types: Marijuana     Comment: occ       Medications/Allergies:  Current Outpatient Medications on File Prior to Visit   Medication Sig Dispense Refill    naproxen (NAPROSYN) 500 mg tablet Take 1 Tab by mouth two (2) times daily (with meals). 60 Tab 0    cyclobenzaprine (FLEXERIL) 5 mg tablet Take 1 Tab by mouth three (3) times daily as needed for Muscle Spasm(s).  30 Tab 0    Blood-Glucose Meter monitoring kit Check blood sugar daily in the morning before eating 1 Kit 0    glucose blood VI test strips (ASCENSIA AUTODISC VI, ONE TOUCH ULTRA TEST VI) strip Check blood sugar daily in the morning before eating 50 Strip 6    lancets misc Check glucose daily in morning before eating 100 Each 11     No current facility-administered medications on file prior to visit. Allergies   Allergen Reactions    Chlorpheniramine-Phenylpropan Runny Nose    Pollen Extracts Sneezing       Objective:  Visit Vitals  /68   Pulse 97   Temp 97.9 °F (36.6 °C) (Temporal)   Resp 16   Ht 6' 8\" (2.032 m)   Wt 269 lb (122 kg)   SpO2 97%   BMI 29.55 kg/m²    Body mass index is 29.55 kg/m². Physical assessment  Physical Exam  Vitals and nursing note reviewed. Eyes:      Conjunctiva/sclera: Conjunctivae normal.      Pupils: Pupils are equal, round, and reactive to light. Neck:      Vascular: No JVD. Cardiovascular:      Rate and Rhythm: Normal rate and regular rhythm. Heart sounds: Normal heart sounds. No murmur heard. No friction rub. No gallop. Pulmonary:      Effort: Pulmonary effort is normal.      Breath sounds: Normal breath sounds. Musculoskeletal:         General: Normal range of motion. Cervical back: Normal range of motion. Skin:     General: Skin is warm and dry. Neurological:      Mental Status: He is alert and oriented to person, place, and time.    Psychiatric:         Mood and Affect: Affect normal.         Cognition and Memory: Memory normal.         Judgment: Judgment normal.           Labwork and Ancillary Studies:    CBC w/Diff  Lab Results   Component Value Date/Time    WBC 6.6 05/04/2019 12:00 AM    HGB 13.0 05/04/2019 12:00 AM    PLATELET 929 80/79/1685 12:00 AM         Basic Metabolic Profile  Lab Results   Component Value Date/Time    Sodium 143 05/11/2021 12:00 AM    Potassium 4.2 05/11/2021 12:00 AM    Chloride 107 (H) 05/11/2021 12:00 AM    CO2 28 05/11/2021 12:00 AM    Glucose 90 05/11/2021 12:00 AM    BUN 11 05/11/2021 12:00 AM    Creatinine 0.94 05/11/2021 12:00 AM    BUN/Creatinine ratio 12 05/11/2021 12:00 AM GFR est  05/11/2021 12:00 AM    GFR est non-AA 99 05/11/2021 12:00 AM    Calcium 9.3 05/11/2021 12:00 AM        Cholesterol  Lab Results   Component Value Date/Time    Cholesterol, total 142 05/11/2021 12:00 AM    HDL Cholesterol 43 05/11/2021 12:00 AM    LDL, calculated 76 05/11/2021 12:00 AM    LDL, calculated 73 07/16/2020 11:11 AM    Triglyceride 128 05/11/2021 12:00 AM       Health Maintenance:   Health Maintenance   Topic Date Due    Pneumococcal 0-64 years (1 of 2 - PPSV23) Never done    Eye Exam Retinal or Dilated  Never done    COVID-19 Vaccine (1) Never done    DTaP/Tdap/Td series (1 - Tdap) Never done    Flu Vaccine (Season Ended) 09/01/2021    MICROALBUMIN Q1  10/09/2021    Foot Exam Q1  10/12/2021    A1C test (Diabetic or Prediabetic)  05/11/2022    Lipid Screen  05/11/2022    Hepatitis C Screening  Completed       I have discussed the diagnosis with the patient and the intended plan as seen in the above orders. The patient has received an After-Visit Summary and questions were answered concerning future plans. An After Visit Summary was printed and given to the patient. All diagnosis have been discussed with the patient and all of the patient's questions have been answered. Follow-up and Dispositions    · Return in about 3 months (around 8/11/2021) for DM, HLD, 15 min, office only, AND, labs prior. Nate Hein, [facility]  [address] [facility]   [address] [facility] [address]  [id number]

## 2021-05-12 LAB
ALBUMIN SERPL-MCNC: 4.3 G/DL (ref 4–5)
ALBUMIN/GLOB SERPL: 1.5 {RATIO} (ref 1.2–2.2)
ALP SERPL-CCNC: 71 IU/L (ref 39–117)
ALT SERPL-CCNC: 27 IU/L (ref 0–44)
AST SERPL-CCNC: 22 IU/L (ref 0–40)
BILIRUB SERPL-MCNC: <0.2 MG/DL (ref 0–1.2)
BUN SERPL-MCNC: 11 MG/DL (ref 6–24)
BUN/CREAT SERPL: 12 (ref 9–20)
C TRACH RRNA SPEC QL NAA+PROBE: NEGATIVE
CALCIUM SERPL-MCNC: 9.3 MG/DL (ref 8.7–10.2)
CHLORIDE SERPL-SCNC: 107 MMOL/L (ref 96–106)
CHOLEST SERPL-MCNC: 142 MG/DL (ref 100–199)
CO2 SERPL-SCNC: 28 MMOL/L (ref 20–29)
CREAT SERPL-MCNC: 0.94 MG/DL (ref 0.76–1.27)
EST. AVERAGE GLUCOSE BLD GHB EST-MCNC: 134 MG/DL
GLOBULIN SER CALC-MCNC: 2.8 G/DL (ref 1.5–4.5)
GLUCOSE SERPL-MCNC: 90 MG/DL (ref 65–99)
HBA1C MFR BLD: 6.3 % (ref 4.8–5.6)
HCV AB S/CO SERPL IA: <0.1 S/CO RATIO (ref 0–0.9)
HDLC SERPL-MCNC: 43 MG/DL
HIV 1+2 AB+HIV1 P24 AG SERPL QL IA: NON REACTIVE
IMP & REVIEW OF LAB RESULTS: NORMAL
LDLC SERPL CALC-MCNC: 76 MG/DL (ref 0–99)
N GONORRHOEA RRNA SPEC QL NAA+PROBE: NEGATIVE
POTASSIUM SERPL-SCNC: 4.2 MMOL/L (ref 3.5–5.2)
PROT SERPL-MCNC: 7.1 G/DL (ref 6–8.5)
SODIUM SERPL-SCNC: 143 MMOL/L (ref 134–144)
T VAGINALIS DNA SPEC QL NAA+PROBE: NEGATIVE
TRIGL SERPL-MCNC: 128 MG/DL (ref 0–149)
VLDLC SERPL CALC-MCNC: 23 MG/DL (ref 5–40)

## 2021-07-15 DIAGNOSIS — E11.9 CONTROLLED TYPE 2 DIABETES MELLITUS WITHOUT COMPLICATION, WITHOUT LONG-TERM CURRENT USE OF INSULIN (HCC): ICD-10-CM

## 2021-07-16 RX ORDER — METFORMIN HYDROCHLORIDE 1000 MG/1
TABLET ORAL
Qty: 180 TABLET | Refills: 0 | Status: SHIPPED | OUTPATIENT
Start: 2021-07-16 | End: 2021-09-08 | Stop reason: SDUPTHER

## 2021-08-11 ENCOUNTER — LAB ONLY (OUTPATIENT)
Dept: FAMILY MEDICINE CLINIC | Age: 46
End: 2021-08-11

## 2021-08-11 DIAGNOSIS — E78.2 MIXED HYPERLIPIDEMIA: ICD-10-CM

## 2021-08-11 DIAGNOSIS — E11.9 CONTROLLED TYPE 2 DIABETES MELLITUS WITHOUT COMPLICATION, WITHOUT LONG-TERM CURRENT USE OF INSULIN (HCC): Primary | ICD-10-CM

## 2021-08-12 LAB
ALBUMIN SERPL-MCNC: 4.2 G/DL (ref 4–5)
ALBUMIN/GLOB SERPL: 1.7 {RATIO} (ref 1.2–2.2)
ALP SERPL-CCNC: 66 IU/L (ref 48–121)
ALT SERPL-CCNC: 40 IU/L (ref 0–44)
AST SERPL-CCNC: 35 IU/L (ref 0–40)
BILIRUB SERPL-MCNC: 0.3 MG/DL (ref 0–1.2)
BUN SERPL-MCNC: 12 MG/DL (ref 6–24)
BUN/CREAT SERPL: 14 (ref 9–20)
CALCIUM SERPL-MCNC: 9.4 MG/DL (ref 8.7–10.2)
CHLORIDE SERPL-SCNC: 105 MMOL/L (ref 96–106)
CHOLEST SERPL-MCNC: 143 MG/DL (ref 100–199)
CO2 SERPL-SCNC: 27 MMOL/L (ref 20–29)
CREAT SERPL-MCNC: 0.88 MG/DL (ref 0.76–1.27)
EST. AVERAGE GLUCOSE BLD GHB EST-MCNC: 137 MG/DL
GLOBULIN SER CALC-MCNC: 2.5 G/DL (ref 1.5–4.5)
GLUCOSE SERPL-MCNC: 84 MG/DL (ref 65–99)
HBA1C MFR BLD: 6.4 % (ref 4.8–5.6)
HDLC SERPL-MCNC: 37 MG/DL
IMP & REVIEW OF LAB RESULTS: NORMAL
LDLC SERPL CALC-MCNC: 87 MG/DL (ref 0–99)
POTASSIUM SERPL-SCNC: 4.5 MMOL/L (ref 3.5–5.2)
PROT SERPL-MCNC: 6.7 G/DL (ref 6–8.5)
SODIUM SERPL-SCNC: 141 MMOL/L (ref 134–144)
TRIGL SERPL-MCNC: 100 MG/DL (ref 0–149)
VLDLC SERPL CALC-MCNC: 19 MG/DL (ref 5–40)

## 2021-09-08 ENCOUNTER — TELEPHONE (OUTPATIENT)
Dept: FAMILY MEDICINE CLINIC | Age: 46
End: 2021-09-08

## 2021-09-08 DIAGNOSIS — E11.9 CONTROLLED TYPE 2 DIABETES MELLITUS WITHOUT COMPLICATION, WITHOUT LONG-TERM CURRENT USE OF INSULIN (HCC): ICD-10-CM

## 2021-09-08 RX ORDER — METFORMIN HYDROCHLORIDE 1000 MG/1
TABLET ORAL
Qty: 180 TABLET | Refills: 0 | Status: SHIPPED | OUTPATIENT
Start: 2021-09-08 | End: 2021-12-08 | Stop reason: SDUPTHER

## 2021-09-08 NOTE — TELEPHONE ENCOUNTER
Patient called and requested a refill for his sweat gland and Metformin medication. Patient has a referral to the Dermatologist. Patient was given the number to the Dermatologist and was informed to make an appointment with them. Patient was scheduled to make an appointment for the Flu Vaccine.

## 2021-09-10 ENCOUNTER — CLINICAL SUPPORT (OUTPATIENT)
Dept: FAMILY MEDICINE CLINIC | Age: 46
End: 2021-09-10
Payer: MEDICAID

## 2021-09-10 VITALS
WEIGHT: 257.8 LBS | OXYGEN SATURATION: 97 % | BODY MASS INDEX: 29.83 KG/M2 | SYSTOLIC BLOOD PRESSURE: 120 MMHG | HEART RATE: 82 BPM | HEIGHT: 78 IN | TEMPERATURE: 98.5 F | DIASTOLIC BLOOD PRESSURE: 61 MMHG

## 2021-09-10 DIAGNOSIS — Z23 ENCOUNTER FOR IMMUNIZATION: Primary | ICD-10-CM

## 2021-09-10 PROCEDURE — 90686 IIV4 VACC NO PRSV 0.5 ML IM: CPT | Performed by: NURSE PRACTITIONER

## 2021-09-10 PROCEDURE — 90471 IMMUNIZATION ADMIN: CPT | Performed by: NURSE PRACTITIONER

## 2021-09-10 NOTE — PROGRESS NOTES
Patient was given VIS for review, consent was obtained and per orders of BRITT Gordon, injection of Influenza vaccine given by Jennifer Gifford CMA. Patient observed. No signs nor symptoms of any adverse reactions. Patient tolerated injection well.

## 2021-12-08 DIAGNOSIS — E11.9 CONTROLLED TYPE 2 DIABETES MELLITUS WITHOUT COMPLICATION, WITHOUT LONG-TERM CURRENT USE OF INSULIN (HCC): ICD-10-CM

## 2021-12-13 RX ORDER — METFORMIN HYDROCHLORIDE 1000 MG/1
TABLET ORAL
Qty: 180 TABLET | Refills: 0 | Status: SHIPPED | OUTPATIENT
Start: 2021-12-13 | End: 2022-04-05 | Stop reason: SDUPTHER

## 2022-03-18 PROBLEM — R91.8 PULMONARY NODULES: Status: ACTIVE | Noted: 2018-11-20

## 2022-03-19 PROBLEM — E78.2 MIXED HYPERLIPIDEMIA: Status: ACTIVE | Noted: 2019-10-24

## 2022-03-19 PROBLEM — L73.2 HYDRADENITIS: Status: ACTIVE | Noted: 2019-10-15

## 2022-03-19 PROBLEM — N52.9 ERECTILE DYSFUNCTION: Status: ACTIVE | Noted: 2017-11-08

## 2022-03-20 PROBLEM — E11.9 CONTROLLED TYPE 2 DIABETES MELLITUS WITHOUT COMPLICATION, WITHOUT LONG-TERM CURRENT USE OF INSULIN (HCC): Status: ACTIVE | Noted: 2018-10-12

## 2022-04-05 ENCOUNTER — OFFICE VISIT (OUTPATIENT)
Dept: FAMILY MEDICINE CLINIC | Age: 47
End: 2022-04-05
Payer: MEDICAID

## 2022-04-05 VITALS
WEIGHT: 276 LBS | OXYGEN SATURATION: 94 % | HEART RATE: 90 BPM | DIASTOLIC BLOOD PRESSURE: 82 MMHG | TEMPERATURE: 98.3 F | RESPIRATION RATE: 20 BRPM | HEIGHT: 78 IN | SYSTOLIC BLOOD PRESSURE: 128 MMHG | BODY MASS INDEX: 31.93 KG/M2

## 2022-04-05 DIAGNOSIS — E11.9 CONTROLLED TYPE 2 DIABETES MELLITUS WITHOUT COMPLICATION, WITHOUT LONG-TERM CURRENT USE OF INSULIN (HCC): Primary | ICD-10-CM

## 2022-04-05 DIAGNOSIS — E78.2 MIXED HYPERLIPIDEMIA: ICD-10-CM

## 2022-04-05 DIAGNOSIS — L73.2 HYDRADENITIS: ICD-10-CM

## 2022-04-05 PROCEDURE — 3044F HG A1C LEVEL LT 7.0%: CPT | Performed by: NURSE PRACTITIONER

## 2022-04-05 PROCEDURE — 99214 OFFICE O/P EST MOD 30 MIN: CPT | Performed by: NURSE PRACTITIONER

## 2022-04-05 RX ORDER — DOXYCYCLINE 100 MG/1
100 CAPSULE ORAL 2 TIMES DAILY
Qty: 20 CAPSULE | Refills: 0 | Status: SHIPPED | OUTPATIENT
Start: 2022-04-05 | End: 2022-04-15

## 2022-04-05 RX ORDER — ATORVASTATIN CALCIUM 20 MG/1
TABLET, FILM COATED ORAL
Qty: 90 TABLET | Refills: 0 | Status: SHIPPED | OUTPATIENT
Start: 2022-04-05 | End: 2022-07-15

## 2022-04-05 RX ORDER — METFORMIN HYDROCHLORIDE 1000 MG/1
TABLET ORAL
Qty: 180 TABLET | Refills: 0 | Status: SHIPPED | OUTPATIENT
Start: 2022-04-05 | End: 2022-07-15

## 2022-04-05 NOTE — PROGRESS NOTES
Room 8      Patient states I would like discuss Januvia and my sweat gland medication . Did patient bring someone? No    Did the patient have DME equipment? No     Did you take your medication today? Yes       1. \"Have you been to the ER, urgent care clinic since your last visit? Hospitalized since your last visit? \" No    2. \"Have you seen or consulted any other health care providers outside of the [address] [place] [address] since your last visit? \" No     3. For patients aged 39-70: Has the patient had a colonoscopy / FIT/ Cologuard? NA - based on age      If the patient is female:    4. For patients aged 41-77: Has the patient had a mammogram within the past 2 years? NA - based on age or sex      11. For patients aged 21-65: Has the patient had a pap smear?  NA - based on age or sex        3 most recent PHQ Screens 4/5/2022   Little interest or pleasure in doing things Not at all   Feeling down, depressed, irritable, or hopeless Not at all   Total Score PHQ 2 0         Health Maintenance Due   Topic Date Due    COVID-19 Vaccine (1) Never done    Pneumococcal 0-64 years (1 of 2 - PPSV23) Never done    DTaP/Tdap/Td series (1 - Tdap) Never done    MICROALBUMIN Q1  10/09/2021    Foot Exam Q1  10/12/2021       Learning Assessment 5/3/2019   PRIMARY LEARNER Patient   HIGHEST LEVEL OF EDUCATION - PRIMARY LEARNER  GRADUATED HIGH SCHOOL OR GED   BARRIERS PRIMARY LEARNER NONE   CO-LEARNER CAREGIVER No   PRIMARY LANGUAGE ENGLISH   LEARNER PREFERENCE PRIMARY DEMONSTRATION   ANSWERED BY self    RELATIONSHIP SELF

## 2022-04-05 NOTE — PROGRESS NOTES
Tyler               [facility]               [phone]      Steph Warner is a 42 y.o. male and presents with Follow Up Chronic Condition, Hypertension, and Diabetes       Assessment/Plan:    Diagnoses and all orders for this visit:    1. Controlled type 2 diabetes mellitus without complication, without long-term current use of insulin (HCC)  -     metFORMIN (GLUCOPHAGE) 1,000 mg tablet; TAKE 1 TABLET BY MOUTH TWICE A DAY WITH MEALS  -     METABOLIC PANEL, COMPREHENSIVE; Future  -     HEMOGLOBIN A1C WITH EAG; Future  -     MICROALBUMIN, UR, RAND W/ MICROALB/CREAT RATIO; Future  Endorses medication compliance, Refill provided, Follow-up labs today and Denies signs and symptoms of hyper or hypoglycemia   2. Mixed hyperlipidemia  -     atorvastatin (LIPITOR) 20 mg tablet; TAKE 1 TABLET BY MOUTH EVERY DAY  -     LIPID PANEL; Future  Endorses medication compliance, Refill provided, Follow-up labs today and Denies abdominal pain or symptoms of myalgia   3. Hydradenitis  -     REFERRAL TO DERMATOLOGY  -     doxycycline (VIBRAMYCIN) 100 mg capsule; Take 1 Capsule by mouth two (2) times a day for 10 days. Endorses problems with hydrandenitis in his axilla area causing pain and discomfort  Treat with ABS  Referral to derm placed  Other orders  -     METABOLIC PANEL, COMPREHENSIVE  -     LIPID PANEL  -     MICROALBUMIN, UR, RAND W/ MICROALB/CREAT RATIO  -     CVD REPORT  -     HEMOGLOBIN A1C WITH EAG        Follow-up and Dispositions    · Return for ASAP, Lab, AND, 3month, DM, HLD, 15 min, office only.            Health Maintenance:   Health Maintenance   Topic Date Due    Pneumococcal 0-64 years (1 - PCV) Never done    DTaP/Tdap/Td series (1 - Tdap) Never done    Foot Exam Q1  10/12/2021    COVID-19 Vaccine (3 - Booster for Pfizer series) 03/14/2022    A1C test (Diabetic or Prediabetic)  04/06/2023    MICROALBUMIN Q1  04/06/2023    Lipid Screen  04/06/2023    Depression Screen 04/12/2023    Eye Exam Retinal or Dilated  05/17/2023    Hepatitis C Screening  Completed    Flu Vaccine  Completed        Subjective:    Labs obtained prior to visit? NO  Reviewed with patient? Not applicable    DMII-   Patient reports medication compliance Daily  Diabetic diet compliance occasionally  Patient monitors blood sugars regularly does not have a glucometer   Reports am fasting sugars range does not check   Denies hypoglycemic episodes yes  Denies polyuria, polydipsia, paraesthesia, vision changes? no, peeing more at night time  Engaging in daily exercise?  No     Diabetic Foot and Eye Exam HM Status   Topic Date Due    Diabetic Foot Care  10/12/2021    Eye Exam  05/17/2023     Hemoglobin A1c   Date Value Ref Range Status   04/06/2022 6.6 (H) 4.8 - 5.6 % Final     Comment:              Prediabetes: 5.7 - 6.4           Diabetes: >6.4           Glycemic control for adults with diabetes: <7.0     ]  Creatinine, urine   Date Value Ref Range Status   04/06/2022 139.5 Not Estab. mg/dL Final     Microalb/Creat ratio (ug/mg creat.)   Date Value Ref Range Status   04/06/2022 6 0 - 29 mg/g creat Final     Comment:                            Normal:                0 -  29                         Moderately increased: 30 - 300                         Severely increased:       >300     10/09/2020 6 0 - 29 mg/g creat Final     Comment:                            Normal:                0 -  29                         Moderately increased: 30 - 300                         Severely increased:       >300                **Please note reference interval change**     07/16/2020 <8 0 - 29 mg/g creat Final     Comment:                            Normal:                0 -  29                         Moderately increased: 30 - 300                         Severely increased:       >300                **Please note reference interval change**       Key Antihyperglycemic Medications             metFORMIN (GLUCOPHAGE) 1,000 mg tablet (Taking) TAKE 1 TABLET BY MOUTH TWICE A DAY WITH MEALS        HLD:  Has been compliant with meds  Yes  Compliant with low-fat diet. most of the time    Denies myalgias or other side effects. yes  The 10-year ASCVD risk score (Zoran Russell, et al., 2013) is: 12.5%    Cholesterol, total   Date Value Ref Range Status   04/06/2022 177 100 - 199 mg/dL Final     Triglyceride   Date Value Ref Range Status   04/06/2022 171 (H) 0 - 149 mg/dL Final     HDL Cholesterol   Date Value Ref Range Status   04/06/2022 40 >39 mg/dL Final     LDL, calculated   Date Value Ref Range Status   04/06/2022 107 (H) 0 - 99 mg/dL Final   ]  Key Antihyperlipidemia Meds             atorvastatin (LIPITOR) 20 mg tablet (Taking) TAKE 1 TABLET BY MOUTH EVERY DAY           ROS:     ROS  As stated in HPI, otherwise all others negative. The problem list was updated as a part of today's visit. Patient Active Problem List   Diagnosis Code    Erectile dysfunction N52.9    Controlled type 2 diabetes mellitus without complication, without long-term current use of insulin ([facility] [address] E11.9    Pulmonary nodules R91.8    Hydradenitis L73.2    Mixed hyperlipidemia E78.2       The PSH, FH were reviewed. SH:  Social History     Tobacco Use    Smoking status: Light Tobacco Smoker    Smokeless tobacco: Never Used    Tobacco comment: black mild    Substance Use Topics    Alcohol use: Yes     Alcohol/week: 1.0 standard drink     Types: 1 Cans of beer per week     Comment: occ beer    Drug use: Yes     Types: Marijuana     Comment: occ       Medications/Allergies:  Current Outpatient Medications on File Prior to Visit   Medication Sig Dispense Refill    cephALEXin (KEFLEX) 500 mg capsule take 1 capsule by mouth three times a day for 7 days      tadalafiL (CIALIS) 5 mg tablet Take 1 Tablet by mouth daily as needed for Erectile Dysfunction.  90 Tablet 1    Blood-Glucose Meter monitoring kit Check blood sugar daily in the morning before eating 1 Kit 0    glucose blood VI test strips (ASCENSIA AUTODISC VI, ONE TOUCH ULTRA TEST VI) strip Check blood sugar daily in the morning before eating 50 Strip 6    lancets misc Check glucose daily in morning before eating 100 Each 11     No current facility-administered medications on file prior to visit. Allergies   Allergen Reactions    Chlorpheniramine-Phenylpropan Runny Nose    Pollen Extracts Sneezing       Objective:  Visit Vitals  /82 (BP 1 Location: Right arm, BP Patient Position: Sitting, BP Cuff Size: Adult) Comment (BP Cuff Size): feet flat on floor   Pulse 90   Temp 98.3 °F (36.8 °C) (Temporal)   Resp 20   Ht 6' 9\" (2.057 m)   Wt 276 lb (125.2 kg)   SpO2 94%   BMI 29.58 kg/m²    Body mass index is 29.58 kg/m². Physical assessment  Physical Exam  Vitals and nursing note reviewed. Eyes:      Conjunctiva/sclera: Conjunctivae normal.      Pupils: Pupils are equal, round, and reactive to light. Cardiovascular:      Rate and Rhythm: Normal rate and regular rhythm. Heart sounds: Normal heart sounds. No murmur heard. No friction rub. No gallop. Pulmonary:      Effort: Pulmonary effort is normal.      Breath sounds: Normal breath sounds. Musculoskeletal:         General: Normal range of motion. Cervical back: Normal range of motion. Skin:     General: Skin is warm and dry. Neurological:      Mental Status: He is alert.            Labwork and Ancillary Studies:    CBC w/Diff  Lab Results   Component Value Date/Time    WBC 6.6 05/04/2019 12:00 AM    HGB 13.0 05/04/2019 12:00 AM    PLATELET 364 88/48/1986 12:00 AM         Basic Metabolic Profile  Lab Results   Component Value Date/Time    Sodium 144 04/06/2022 08:21 AM    Potassium 4.7 04/06/2022 08:21 AM    Chloride 103 04/06/2022 08:21 AM    CO2 24 04/06/2022 08:21 AM    Glucose 156 (H) 04/06/2022 08:21 AM    BUN 12 04/06/2022 08:21 AM    Creatinine 0.93 04/06/2022 08:21 AM    BUN/Creatinine ratio 13 04/06/2022 08:21 AM    GFR est  08/11/2021 11:11 AM    GFR est non- 08/11/2021 11:11 AM    Calcium 9.2 04/06/2022 08:21 AM        Cholesterol  Lab Results   Component Value Date/Time    Cholesterol, total 177 04/06/2022 08:21 AM    HDL Cholesterol 40 04/06/2022 08:21 AM    LDL, calculated 107 (H) 04/06/2022 08:21 AM    LDL, calculated 73 07/16/2020 11:11 AM    Triglyceride 171 (H) 04/06/2022 08:21 AM           I have discussed the diagnosis with the patient and the intended plan as seen in the above orders. The patient has received an After-Visit Summary and questions were answered concerning future plans. An After Visit Summary was printed and given to the patient. All diagnosis have been discussed with the patient and all of the patient's questions have been answered. Follow-up and Dispositions    · Return for ASAP, Lab, AND, 3month, DM, HLD, 15 min, office only. Castillo Woodard, [facility]  [address]  [address]

## 2022-04-08 LAB
ALBUMIN SERPL-MCNC: 4.4 G/DL (ref 4–5)
ALBUMIN/CREAT UR: 6 MG/G CREAT (ref 0–29)
ALBUMIN/GLOB SERPL: 1.6 {RATIO} (ref 1.2–2.2)
ALP SERPL-CCNC: 67 IU/L (ref 44–121)
ALT SERPL-CCNC: 40 IU/L (ref 0–44)
AST SERPL-CCNC: 23 IU/L (ref 0–40)
BILIRUB SERPL-MCNC: 0.2 MG/DL (ref 0–1.2)
BUN SERPL-MCNC: 12 MG/DL (ref 6–24)
BUN/CREAT SERPL: 13 (ref 9–20)
CALCIUM SERPL-MCNC: 9.2 MG/DL (ref 8.7–10.2)
CHLORIDE SERPL-SCNC: 103 MMOL/L (ref 96–106)
CHOLEST SERPL-MCNC: 177 MG/DL (ref 100–199)
CO2 SERPL-SCNC: 24 MMOL/L (ref 20–29)
CREAT SERPL-MCNC: 0.93 MG/DL (ref 0.76–1.27)
CREAT UR-MCNC: 139.5 MG/DL
EGFR: 104 ML/MIN/1.73
EST. AVERAGE GLUCOSE BLD GHB EST-MCNC: 143 MG/DL
GLOBULIN SER CALC-MCNC: 2.7 G/DL (ref 1.5–4.5)
GLUCOSE SERPL-MCNC: 156 MG/DL (ref 65–99)
HBA1C MFR BLD: 6.6 % (ref 4.8–5.6)
HDLC SERPL-MCNC: 40 MG/DL
IMP & REVIEW OF LAB RESULTS: NORMAL
LDLC SERPL CALC-MCNC: 107 MG/DL (ref 0–99)
MICROALBUMIN UR-MCNC: 8.2 UG/ML
POTASSIUM SERPL-SCNC: 4.7 MMOL/L (ref 3.5–5.2)
PROT SERPL-MCNC: 7.1 G/DL (ref 6–8.5)
SODIUM SERPL-SCNC: 144 MMOL/L (ref 134–144)
TRIGL SERPL-MCNC: 171 MG/DL (ref 0–149)
VLDLC SERPL CALC-MCNC: 30 MG/DL (ref 5–40)

## 2022-04-12 ENCOUNTER — OFFICE VISIT (OUTPATIENT)
Dept: FAMILY MEDICINE CLINIC | Age: 47
End: 2022-04-12
Payer: MEDICAID

## 2022-04-12 VITALS
HEART RATE: 101 BPM | SYSTOLIC BLOOD PRESSURE: 127 MMHG | DIASTOLIC BLOOD PRESSURE: 76 MMHG | OXYGEN SATURATION: 96 % | TEMPERATURE: 98.6 F | BODY MASS INDEX: 32.05 KG/M2 | RESPIRATION RATE: 18 BRPM | HEIGHT: 78 IN | WEIGHT: 277 LBS

## 2022-04-12 DIAGNOSIS — R10.9 FLANK PAIN: Primary | ICD-10-CM

## 2022-04-12 LAB
BILIRUB UR QL STRIP: NEGATIVE
GLUCOSE UR-MCNC: NEGATIVE MG/DL
KETONES P FAST UR STRIP-MCNC: NEGATIVE MG/DL
PH UR STRIP: 6.5 [PH] (ref 4.6–8)
PROT UR QL STRIP: NEGATIVE
SP GR UR STRIP: 1.02 (ref 1–1.03)
UA UROBILINOGEN AMB POC: NORMAL (ref 0.2–1)
URINALYSIS CLARITY POC: CLEAR
URINALYSIS COLOR POC: YELLOW
URINE BLOOD POC: NORMAL
URINE LEUKOCYTES POC: NEGATIVE
URINE NITRITES POC: NEGATIVE

## 2022-04-12 PROCEDURE — 81003 URINALYSIS AUTO W/O SCOPE: CPT | Performed by: NURSE PRACTITIONER

## 2022-04-12 PROCEDURE — 99213 OFFICE O/P EST LOW 20 MIN: CPT | Performed by: NURSE PRACTITIONER

## 2022-04-12 NOTE — PROGRESS NOTES
Room 8    Patient presents today for Urinalysis     Did patient bring someone? No    Did the patient have DME equipment? No      Did you take your medication today? Yes       1. \"Have you been to the ER, urgent care clinic since your last visit? Hospitalized since your last visit? \" No    2. \"Have you seen or consulted any other health care providers outside of the [address] [place] [address] since your last visit? \" No     3. For patients aged 39-70: Has the patient had a colonoscopy / FIT/ Cologuard? Yes - no Care Gap present      If the patient is female:    4. For patients aged 41-77: Has the patient had a mammogram within the past 2 years? NA - based on age or sex      11. For patients aged 21-65: Has the patient had a pap smear?  NA - based on age or sex        3 most recent PHQ Screens 4/12/2022   Little interest or pleasure in doing things Not at all   Feeling down, depressed, irritable, or hopeless Not at all   Total Score PHQ 2 0         Health Maintenance Due   Topic Date Due    COVID-19 Vaccine (1) Never done    Pneumococcal 0-64 years (1 of 2 - PPSV23) Never done    DTaP/Tdap/Td series (1 - Tdap) Never done    Foot Exam Q1  10/12/2021       Learning Assessment 5/3/2019   PRIMARY LEARNER Patient   HIGHEST LEVEL OF EDUCATION - PRIMARY LEARNER  GRADUATED HIGH SCHOOL OR GED   BARRIERS PRIMARY LEARNER NONE   CO-LEARNER CAREGIVER No   PRIMARY LANGUAGE ENGLISH   LEARNER PREFERENCE PRIMARY DEMONSTRATION   ANSWERED BY self    RELATIONSHIP SELF

## 2022-04-12 NOTE — PATIENT INSTRUCTIONS
1. Between Shoulder Blades  shoudler blade ball  Instructions:    Position your body over the massage ball as to target the muscles between the shoulder blades. Apply an appropriate amount of pressure onto the ball. Slowly Squaxin around the target area and pause at any areas that illicit more tenderness. Once you find a tender spot, move you arm up/down to increase the release. Repeat on other side. 2. Chest  ball chest  Instructions:    Position your body over a massage ball so that your chest muscles are targeted. Apply appropriate amount of pressure onto the ball. Slowly Squaxin around the target area and pause at any areas that illicit more tenderness. Once you have found a painful spot, move your arm around to increase the amount of release in the area. Repeat on other side. 3. Latissimus Dorsi  latfoam  Instructions:    Place a foam roller on the floor. Position your body on top of the foam roller so that it is in direct contact with the latissimus dorsi. (see above)  Apply an appropriate amount of pressure onto the foam roller. Slowly move around the target area and pause at any areas that illicit more tenderness. You may want to move your arm around to increase the release. Repeat on other side. 4. Upper Abdominals  ball upper abs    Instructions:    Position your body over a massage ball as to target the upper abdominal region. Apply a small amount of pressure onto the ball. Slowly Squaxin around the target area and pause at any areas that illicit more tenderness. Note: Please take care with this release. Excessive pressure in this region can compress your organs! Repeat on other side. 5. Intercostals  Instructions: With your finger, feel the gaps between your ribs. Starting from the outer side of your ribs, trace this gap towards the mid line. Apply appropriate amount of pressure through your finger tip.   Once you have found a tender area, maintain the finger tip pressure and take 3x breaths in/out. Make sure to cover as many ribs as you can locate. B) Stretches  Now that you have released your muscles that influence the thoracic spine, the next step is to stretch! Guideline:    Hold each stretch for a minimum of 30-45 seconds. Repeat 2-3 times. Make sure you feel the stretch in the targeted areas. 6. Side Stretch  Side stretch  This stretch predominantly targets the latissimus dorsi muscle. Instructions: Whilst standing, reach over and bend to the side. Aim to feel the stretch on the side of your body. Do not let your torso rotate. Repeat on the other side. 7. Front Stretch  Chest stretch    This stretch predominantly targets the pectoralis (chest) muscle. Instructions:    Place your outstretched hand on a door frame. Lunge forward. Pull your shoulder blades backwards. Do not arch your lower back. (Keep rib cage low.)  Aim to feel a stretch at the front of your chest.  Repeat on the other side. 8. Intercostals  These muscles are situated between the ribs. (Surprisingly, they can actually get pretty tight!)    intercostal stretch   I call this my sexy pose :)    Instructions:    Lie on your side whilst leaning on your elbow  [place] your body to the upper side. Aim to feel a stretch at the side of your rib cage. Aim to take deep breaths into the area of stretch. Repeat on the other side. 9. Posterior Line Stretch  Posterior line stretch    Instructions: Whilst sitting down, pull your head down and bring your chin closer to your upper chest.  Bend as far forward as possible whilst making sure to round the upper back. Aim to feel a stretch at the back between the shoulder blades. Take deep breaths into the area of stretch. C) Mobility  If you have completed the releases and stretches, your thoracic spine should feel much looser now.  Lets get moving! Note:    You may feel/hear clicks when you perform these mobility exercises.   (This is just the release of pressure in your joints.)  Aim to move your spine as much as possible without compensating with other joints. 10. Extension  Thoracic extension    Instructions:    Place a foam roller (a rolled up towel will work too) on the floor. Lie down on the ground and position the foam roll so that it is in the middle of your upper back. Stretch arms over head and arch backwards. Keep your lower ribs down to prevent over arching of the lower back. Oscillate this motion for 30 repetitions. 11. Flexion  flat round back  Instructions:    Get into the 4 point kneel position. Proceed to round your upper back. Aim to feel a gentle stretch at the back as your elongate your spine. Return to the starting position. Alternate between these 2 positions for 30 repetitions. Progression: Try to round your upper back one vertebra at a time. (aka Segmental control)  12. Rotation  rotation  Instructions:    Sit down on a chair. Place your hand on the outer side of the opposite knee  With the other hand, grab onto the back of the chair. Rotate your spine. (as to look behind you)  Oscillate in this position for 30 repetitions. Repeat on the other side. 13. Translations  thoracic translation  Instructions: Whilst sitting, slide your shoulders to the side. Aim to keep your shoulders level whilst performing this movement. Alternate sides for 30 repetitions. D) Strengthening  14. Wall Melvin  melvin 1 melvin 2  Instructions:    Stand with your back to a wall. Keep your back and arms pulled backwards as to remain in contact with the wall throughout movements. Place your arms in the W starting position. Transition to I position. Aim to feel the contraction in the muscles between the shoulder blade. Repeat 10-20 times. 15. Parallel Melvin  horizontal retraction  Instructions:    Support your chest on a stool. (as to keep your body parallel with the floor)  Place your arms in the W starting position.   Transition to I position. Keep your back muscles pulled backwards throughout all movements. Repeat 10-20 times. 16. Side Bends  Instructions: Whilst standing, hold onto weights in your hands. Use an appropriate weight. Proceed to bend all the way to the side. Make sure that you do not twist your body. Hold for 5 seconds. Alternate sides. Repeat 20 times. Note: Increase the weight when you are ready to progress the exercise. 17. Rotation In 4 Point Kneel  Instructions:    Get into the 4 point kneel position. Place one hand behind your head. Proceed to twist your body. Aim to get the elbow pointing towards the roof. Gently brace your abdominal muscles. Keep your ribs cage low. Do not flare you ribs out. Hold for 5 seconds. Repeat 20 times. Repeat exercise on the other side.

## 2022-04-12 NOTE — PROGRESS NOTES
Tyler               [address]               [phone]      Pardeep Sanz is a 42 y.o. male and presents with Flank Pain ( Paitent states side pill)       Assessment/Plan:    Diagnoses and all orders for this visit:    1. Flank pain  -     AMB POC URINALYSIS DIP STICK AUTO W/O MICRO    POC UA negative  Most likely muscular, hand out on stretches and exercises to perform at home provided  Patient verbalized understanding and is in agreement with this plan of care    Follow-up and Dispositions    · Return for keep previously scheduled follow up. Health Maintenance:   Health Maintenance   Topic Date Due    Pneumococcal 0-64 years (1 - PCV) Never done    DTaP/Tdap/Td series (1 - Tdap) Never done    Foot Exam Q1  10/12/2021    COVID-19 Vaccine (3 - Booster for Pfizer series) 03/14/2022    A1C test (Diabetic or Prediabetic)  04/06/2023    MICROALBUMIN Q1  04/06/2023    Lipid Screen  04/06/2023    Depression Screen  04/12/2023    Eye Exam Retinal or Dilated  05/17/2023    Hepatitis C Screening  Completed    Flu Vaccine  Completed        Subjective:    Labs obtained prior to visit? YES  Reviewed with patient? Yes    Flank pain  Onset: 1 month ago  Location: both flanks  Characteristics: problem happens intermittently, he can stretch and the pain will go away, the problem happens on one side at a time, happens when he is standing, sitting or lying down. The problem happens if he stretches too much. 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 The problem list was updated as a part of today's visit.   Patient Active Problem List   Diagnosis Code    Erectile dysfunction N52.9    Controlled type 2 diabetes mellitus without complication, without long-term current use of insulin (HCC) E11.9    Pulmonary nodules R91.8    Hydradenitis L73.2    Mixed hyperlipidemia E78.2       The PSH, FH were reviewed. SH:  Social History     Tobacco Use    Smoking status: Light Tobacco Smoker    Smokeless tobacco: Never Used    Tobacco comment: black mild    Substance Use Topics    Alcohol use: Yes     Alcohol/week: 1.0 standard drink     Types: 1 Cans of beer per week     Comment: occ beer    Drug use: Yes     Types: Marijuana     Comment: occ       Medications/Allergies:  Current Outpatient Medications on File Prior to Visit   Medication Sig Dispense Refill    metFORMIN (GLUCOPHAGE) 1,000 mg tablet TAKE 1 TABLET BY MOUTH TWICE A DAY WITH MEALS 180 Tablet 0    atorvastatin (LIPITOR) 20 mg tablet TAKE 1 TABLET BY MOUTH EVERY DAY 90 Tablet 0    cephALEXin (KEFLEX) 500 mg capsule take 1 capsule by mouth three times a day for 7 days      tadalafiL (CIALIS) 5 mg tablet Take 1 Tablet by mouth daily as needed for Erectile Dysfunction. 90 Tablet 1    Blood-Glucose Meter monitoring kit Check blood sugar daily in the morning before eating 1 Kit 0    glucose blood VI test strips (ASCENSIA AUTODISC VI, ONE TOUCH ULTRA TEST VI) strip Check blood sugar daily in the morning before eating 50 Strip 6    lancets misc Check glucose daily in morning before eating 100 Each 11     No current facility-administered medications on file prior to visit. Allergies   Allergen Reactions    Chlorpheniramine-Phenylpropan Runny Nose    Pollen Extracts Sneezing       Objective:  Visit Vitals  /76 (BP 1 Location: Right arm, BP Patient Position: Sitting, BP Cuff Size: Adult) Comment (BP Patient Position): feet flat on the floor   Pulse (!) 101   Temp 98.6 °F (37 °C) (Temporal)   Resp 18   Ht 6' 9\" (2.057 m)   Wt 277 lb (125.6 kg)   SpO2 96%   BMI 29.68 kg/m²    Body mass index is 29.68 kg/m².     Physical assessment  Physical Exam  Abdominal:      Tenderness: There is no right CVA tenderness or left CVA tenderness. Musculoskeletal:      Comments: No spine or paraspinous tenderness, no muscle tenderness with palpation           Labwork and Ancillary Studies:    CBC w/Diff  Lab Results   Component Value Date/Time    WBC 6.6 05/04/2019 12:00 AM    HGB 13.0 05/04/2019 12:00 AM    PLATELET 517 39/75/7781 12:00 AM         Basic Metabolic Profile  Lab Results   Component Value Date/Time    Sodium 144 04/06/2022 08:21 AM    Potassium 4.7 04/06/2022 08:21 AM    Chloride 103 04/06/2022 08:21 AM    CO2 24 04/06/2022 08:21 AM    Glucose 156 (H) 04/06/2022 08:21 AM    BUN 12 04/06/2022 08:21 AM    Creatinine 0.93 04/06/2022 08:21 AM    BUN/Creatinine ratio 13 04/06/2022 08:21 AM    GFR est  08/11/2021 11:11 AM    GFR est non- 08/11/2021 11:11 AM    Calcium 9.2 04/06/2022 08:21 AM        Cholesterol  Lab Results   Component Value Date/Time    Cholesterol, total 177 04/06/2022 08:21 AM    HDL Cholesterol 40 04/06/2022 08:21 AM    LDL, calculated 107 (H) 04/06/2022 08:21 AM    LDL, calculated 73 07/16/2020 11:11 AM    Triglyceride 171 (H) 04/06/2022 08:21 AM           I have discussed the diagnosis with the patient and the intended plan as seen in the above orders. The patient has received an After-Visit Summary and questions were answered concerning future plans. An After Visit Summary was printed and given to the patient. All diagnosis have been discussed with the patient and all of the patient's questions have been answered. Follow-up and Dispositions    · Return for keep previously scheduled follow up. Addis Kaufman, KAREEM-BC  [address] [facility]   [address] [facility] [address]  [id number]

## 2022-04-26 ENCOUNTER — TELEPHONE (OUTPATIENT)
Dept: FAMILY MEDICINE CLINIC | Age: 47
End: 2022-04-26

## 2022-04-26 DIAGNOSIS — J30.89 ENVIRONMENTAL AND SEASONAL ALLERGIES: Primary | ICD-10-CM

## 2022-04-26 NOTE — TELEPHONE ENCOUNTER
Patient called and stated he requested at his previous appointment medication for his allergies. Patient stated he need something sent to his pharmacy today.  Please advise

## 2022-04-28 RX ORDER — CETIRIZINE HCL 10 MG
10 TABLET ORAL DAILY
Qty: 30 TABLET | Refills: 1 | Status: SHIPPED | OUTPATIENT
Start: 2022-04-28 | End: 2022-07-19 | Stop reason: SDUPTHER

## 2022-04-29 NOTE — TELEPHONE ENCOUNTER
1st attempt calling patient in regards of medication. Patient did not answer. Left message stating to call the office back.

## 2022-06-07 ENCOUNTER — TELEPHONE (OUTPATIENT)
Dept: FAMILY MEDICINE CLINIC | Age: 47
End: 2022-06-07

## 2022-06-07 DIAGNOSIS — R06.83 SNORING: Primary | ICD-10-CM

## 2022-06-07 NOTE — TELEPHONE ENCOUNTER
----- Message from Vanna Luna sent at 6/7/2022  9:45 AM EDT -----  Subject: Referral Request    QUESTIONS   Reason for referral request? Sleep Apnea   Has the physician seen you for this condition before? No   Preferred Specialist (if applicable)? Do you already have an appointment scheduled? No  Additional Information for Provider? The patient would like a referral to   a sleep apnea doctor. The Patient stated he would like to bee seen at the   office on [facility].   ---------------------------------------------------------------------------  --------------  [address]  What is the best way for the office to contact you? OK to leave message on   voicemail  Preferred Call Back Phone Number? [phone]  ---------------------------------------------------------------------------  --------------  SCRIPT ANSWERS  Relationship to Patient?  Self

## 2022-06-07 NOTE — TELEPHONE ENCOUNTER
1st attempt calling patient to schedule a sooner appointment in regards of message stating . Wife states I am  snoring loudly in my sleep and my leg shakes while sleeping.

## 2022-06-07 NOTE — TELEPHONE ENCOUNTER
----- Message from Jennie Bronson sent at 6/7/2022 12:17 PM EDT -----  Subject: Message to Provider    QUESTIONS  Information for Provider? pt stated that they were returning a call please   follow up   ---------------------------------------------------------------------------  --------------  [address]  What is the best way for the office to contact you? OK to leave message on   voicemail  Preferred Call Back Phone Number?  [phone]  ---------------------------------------------------------------------------  --------------  SCRIPT ANSWERS  undefined

## 2022-06-07 NOTE — TELEPHONE ENCOUNTER
Patient called back and stated that his snoring is very bad, wife stated that he stops breathing and his legs will shake. She has to touch him to get him to start breathing again.

## 2022-06-08 NOTE — TELEPHONE ENCOUNTER
Informed patient that ABHINAV Rasmussen has sent a referral for patient to go to Sleep Medicine. Patient has been giving the name and location of where the referral has been sent to. Patient verbalized understanding.

## 2022-07-13 DIAGNOSIS — E11.9 CONTROLLED TYPE 2 DIABETES MELLITUS WITHOUT COMPLICATION, WITHOUT LONG-TERM CURRENT USE OF INSULIN (HCC): ICD-10-CM

## 2022-07-13 DIAGNOSIS — E78.2 MIXED HYPERLIPIDEMIA: ICD-10-CM

## 2022-07-15 RX ORDER — METFORMIN HYDROCHLORIDE 1000 MG/1
TABLET ORAL
Qty: 180 TABLET | Refills: 0 | Status: SHIPPED | OUTPATIENT
Start: 2022-07-15 | End: 2022-07-19 | Stop reason: SDUPTHER

## 2022-07-15 RX ORDER — ATORVASTATIN CALCIUM 20 MG/1
TABLET, FILM COATED ORAL
Qty: 90 TABLET | Refills: 0 | Status: SHIPPED | OUTPATIENT
Start: 2022-07-15 | End: 2022-07-19 | Stop reason: SDUPTHER

## 2022-07-19 ENCOUNTER — OFFICE VISIT (OUTPATIENT)
Dept: FAMILY MEDICINE CLINIC | Age: 47
End: 2022-07-19
Payer: MEDICAID

## 2022-07-19 VITALS
OXYGEN SATURATION: 96 % | HEART RATE: 86 BPM | SYSTOLIC BLOOD PRESSURE: 126 MMHG | TEMPERATURE: 98.6 F | RESPIRATION RATE: 18 BRPM | HEIGHT: 78 IN | WEIGHT: 273 LBS | DIASTOLIC BLOOD PRESSURE: 77 MMHG | BODY MASS INDEX: 31.59 KG/M2

## 2022-07-19 DIAGNOSIS — E11.9 CONTROLLED TYPE 2 DIABETES MELLITUS WITHOUT COMPLICATION, WITHOUT LONG-TERM CURRENT USE OF INSULIN (HCC): Primary | ICD-10-CM

## 2022-07-19 DIAGNOSIS — J30.89 ENVIRONMENTAL AND SEASONAL ALLERGIES: ICD-10-CM

## 2022-07-19 DIAGNOSIS — E78.2 MIXED HYPERLIPIDEMIA: ICD-10-CM

## 2022-07-19 PROCEDURE — 99214 OFFICE O/P EST MOD 30 MIN: CPT | Performed by: NURSE PRACTITIONER

## 2022-07-19 PROCEDURE — 3044F HG A1C LEVEL LT 7.0%: CPT | Performed by: NURSE PRACTITIONER

## 2022-07-19 RX ORDER — METFORMIN HYDROCHLORIDE 1000 MG/1
1000 TABLET ORAL 2 TIMES DAILY WITH MEALS
Qty: 180 TABLET | Refills: 3 | Status: SHIPPED | OUTPATIENT
Start: 2022-07-19 | End: 2022-07-25 | Stop reason: SDUPTHER

## 2022-07-19 RX ORDER — CETIRIZINE HCL 10 MG
10 TABLET ORAL DAILY
Qty: 30 TABLET | Refills: 1 | Status: SHIPPED | OUTPATIENT
Start: 2022-07-19 | End: 2022-07-25 | Stop reason: SDUPTHER

## 2022-07-19 RX ORDER — ATORVASTATIN CALCIUM 20 MG/1
TABLET, FILM COATED ORAL
Qty: 90 TABLET | Refills: 3 | Status: SHIPPED | OUTPATIENT
Start: 2022-07-19 | End: 2022-07-25 | Stop reason: SDUPTHER

## 2022-07-19 NOTE — PROGRESS NOTES
[facility] [address]               [phone]      Farhana Zarco is a 42 y.o. male and presents with No chief complaint on file. Assessment/Plan:    Diagnoses and all orders for this visit:    1. Controlled type 2 diabetes mellitus without complication, without long-term current use of insulin (HCC)  -     metFORMIN (GLUCOPHAGE) 1,000 mg tablet; Take 1 Tablet by mouth two (2) times daily (with meals). -     METABOLIC PANEL, COMPREHENSIVE; Future  -     HEMOGLOBIN A1C WITH EAG; Future  -     MICROALBUMIN, UR, RAND W/ MICROALB/CREAT RATIO; Future  Endorses medication compliance, Refill provided, Follow up labs prior to next visit, Denies signs and symptoms of hyper or hypoglycemia, Diabetes controlled, A1C <7 and A1C 6.1% in office today   2. Mixed hyperlipidemia  -     atorvastatin (LIPITOR) 20 mg tablet; take 1 tablet by mouth once daily  -     LIPID PANEL; Future  Endorses medication compliance, Refill provided, Follow up labs prior to next visit and Denies abdominal pain or symptoms of myalgia   3. Environmental and seasonal allergies  -     cetirizine (ZYRTEC) 10 mg tablet; Take 1 Tablet by mouth daily. Refill provided      Follow-up and Dispositions    · Return in about 4 months (around 11/19/2022) for DM, HLD, DM foot, 30 min, office only, with, labs prior. Health Maintenance:   Health Maintenance   Topic Date Due    Pneumococcal 0-64 years (1 - PCV) Never done    DTaP/Tdap/Td series (1 - Tdap) Never done    Foot Exam Q1  10/12/2021    Flu Vaccine (1) 09/01/2022    A1C test (Diabetic or Prediabetic)  04/06/2023    MICROALBUMIN Q1  04/06/2023    Lipid Screen  04/06/2023    Depression Screen  04/12/2023    Eye Exam Retinal or Dilated  05/17/2023    Hepatitis C Screening  Completed    COVID-19 Vaccine  Completed        Subjective:    Labs obtained prior to visit? NO  Reviewed with patient?  Not applicable    DMII- Q7A today 6.1%   Patient reports medication compliance Daily  Diabetic diet compliance most of the time  Patient monitors blood sugars regularly never   Reports am fasting sugars range does not check   Denies hypoglycemic episodes yes  Denies polyuria, polydipsia, paraesthesia, vision changes? yes  Engaging in daily exercise? No     Diabetic Foot and Eye Exam HM Status   Topic Date Due    Diabetic Foot Care  10/12/2021    Eye Exam  05/17/2023     Hemoglobin A1c   Date Value Ref Range Status   04/06/2022 6.6 (H) 4.8 - 5.6 % Final     Comment:              Prediabetes: 5.7 - 6.4           Diabetes: >6.4           Glycemic control for adults with diabetes: <7.0     ]  Creatinine, urine   Date Value Ref Range Status   04/06/2022 139.5 Not Estab. mg/dL Final     Microalb/Creat ratio (ug/mg creat.)   Date Value Ref Range Status   04/06/2022 6 0 - 29 mg/g creat Final     Comment:                            Normal:                0 -  29                         Moderately increased: 30 - 300                         Severely increased:       >300     10/09/2020 6 0 - 29 mg/g creat Final     Comment:                            Normal:                0 -  29                         Moderately increased: 30 - 300                         Severely increased:       >300                **Please note reference interval change**     07/16/2020 <8 0 - 29 mg/g creat Final     Comment:                            Normal:                0 -  29                         Moderately increased: 30 - 300                         Severely increased:       >300                **Please note reference interval change**       Key Antihyperglycemic Medications             metFORMIN (GLUCOPHAGE) 1,000 mg tablet (Taking) Take 1 Tablet by mouth two (2) times daily (with meals). HLD:  Has been compliant with meds  Yes  Compliant with low-fat diet. most of the time    Denies myalgias or other side effects.  yes  The 10-year ASCVD risk score (Chantal Noriega, et al., 2013) is: 12.4%    Cholesterol, total   Date Value Ref Range Status   04/06/2022 177 100 - 199 mg/dL Final     Triglyceride   Date Value Ref Range Status   04/06/2022 171 (H) 0 - 149 mg/dL Final     HDL Cholesterol   Date Value Ref Range Status   04/06/2022 40 >39 mg/dL Final     LDL, calculated   Date Value Ref Range Status   04/06/2022 107 (H) 0 - 99 mg/dL Final   ]  Key Antihyperlipidemia Meds             atorvastatin (LIPITOR) 20 mg tablet (Taking) take 1 tablet by mouth once daily           ROS:     ROS  As stated in HPI, otherwise all others negative. The problem list was updated as a part of today's visit. Patient Active Problem List   Diagnosis Code    Erectile dysfunction N52.9    Controlled type 2 diabetes mellitus without complication, without long-term current use of insulin ([facility] [address] E11.9    Pulmonary nodules R91.8    Hydradenitis L73.2    Mixed hyperlipidemia E78.2       The PSH, FH were reviewed. SH:  Social History     Tobacco Use    Smoking status: Light Tobacco Smoker    Smokeless tobacco: Never Used    Tobacco comment: black mild    Substance Use Topics    Alcohol use: Yes     Alcohol/week: 1.0 standard drink     Types: 1 Cans of beer per week     Comment: occ beer    Drug use: Yes     Types: Marijuana     Comment: occ       Medications/Allergies:  Current Outpatient Medications on File Prior to Visit   Medication Sig Dispense Refill    tadalafiL (CIALIS) 5 mg tablet TAKE ONE TABLET BY MOUTH DAILY AS NEEDED 30 Tablet 3    sildenafil citrate (VIAGRA) 100 mg tablet Take 1 Tablet by mouth daily as needed for Erectile Dysfunction.  10 Tablet 3    Blood-Glucose Meter monitoring kit Check blood sugar daily in the morning before eating 1 Kit 0    glucose blood VI test strips (ASCENSIA AUTODISC VI, ONE TOUCH ULTRA TEST VI) strip Check blood sugar daily in the morning before eating 50 Strip 6    lancets misc Check glucose daily in morning before eating 100 Each 11    [DISCONTINUED] atorvastatin (LIPITOR) 20 mg tablet take 1 tablet by mouth once daily 90 Tablet 0    [DISCONTINUED] metFORMIN (GLUCOPHAGE) 1,000 mg tablet take 1 tablet by mouth twice a day with meals 180 Tablet 0    [DISCONTINUED] cetirizine (ZYRTEC) 10 mg tablet Take 1 Tablet by mouth daily. 30 Tablet 1     No current facility-administered medications on file prior to visit. Allergies   Allergen Reactions    Chlorpheniramine-Phenylpropan Runny Nose    Pollen Extracts Sneezing       Objective:  Visit Vitals  /77 (BP 1 Location: Right arm, BP Patient Position: Sitting, BP Cuff Size: Adult) Comment (BP Patient Position): feet flat on floor   Pulse 86   Temp 98.6 °F (37 °C) (Temporal)   Resp 18   Ht 6' 8\" (2.032 m)   Wt 273 lb (123.8 kg)   SpO2 96%   BMI 29.99 kg/m²    Body mass index is 29.99 kg/m². Physical assessment  Physical Exam  Vitals and nursing note reviewed. Eyes:      Conjunctiva/sclera: Conjunctivae normal.      Pupils: Pupils are equal, round, and reactive to light. Cardiovascular:      Rate and Rhythm: Normal rate and regular rhythm. Heart sounds: Normal heart sounds. No murmur heard. No friction rub. No gallop. Pulmonary:      Effort: Pulmonary effort is normal.      Breath sounds: Normal breath sounds. Musculoskeletal:         General: Normal range of motion. Cervical back: Normal range of motion. Skin:     General: Skin is warm and dry. Neurological:      Mental Status: He is alert.            Labwork and Ancillary Studies:    CBC w/Diff  Lab Results   Component Value Date/Time    WBC 6.6 05/04/2019 12:00 AM    HGB 13.0 05/04/2019 12:00 AM    PLATELET 601 79/66/5734 12:00 AM         Basic Metabolic Profile  Lab Results   Component Value Date/Time    Sodium 144 04/06/2022 08:21 AM    Potassium 4.7 04/06/2022 08:21 AM    Chloride 103 04/06/2022 08:21 AM    CO2 24 04/06/2022 08:21 AM    Glucose 156 (H) 04/06/2022 08:21 AM    BUN 12 04/06/2022 08:21 AM    Creatinine 0.93 04/06/2022 08:21 AM BUN/Creatinine ratio 13 04/06/2022 08:21 AM    GFR est  08/11/2021 11:11 AM    GFR est non- 08/11/2021 11:11 AM    Calcium 9.2 04/06/2022 08:21 AM        Cholesterol  Lab Results   Component Value Date/Time    Cholesterol, total 177 04/06/2022 08:21 AM    HDL Cholesterol 40 04/06/2022 08:21 AM    LDL, calculated 107 (H) 04/06/2022 08:21 AM    LDL, calculated 73 07/16/2020 11:11 AM    Triglyceride 171 (H) 04/06/2022 08:21 AM           I have discussed the diagnosis with the patient and the intended plan as seen in the above orders. The patient has received an After-Visit Summary and questions were answered concerning future plans. An After Visit Summary was printed and given to the patient. All diagnosis have been discussed with the patient and all of the patient's questions have been answered. Follow-up and Dispositions    · Return in about 4 months (around 11/19/2022) for DM, HLD, DM foot, 30 min, office only, with, labs prior. Merle Burgos, [facility]P-BC  [address] [facility]   [address] [facility] [address]  [id number]

## 2022-07-19 NOTE — PROGRESS NOTES
Room 8    When asked if patient has seen the Dermatologist patient states no . Patient referral order has been re-printed and address has been highlighted for patient. When asked if patient has any concerns he would like to address with ABHINAV Reagan patient states yes I saw on TV that having Diabetes can cause strokes and Heart Attacks. Did patient bring someone? No    Did the patient have DME equipment? No     Did you take your medication today? Yes       1. \"Have you been to the ER, urgent care clinic since your last visit? Hospitalized since your last visit? \" No    2. \"Have you seen or consulted any other health care providers outside of the [address] [place] [address] since your last visit? \" No     3. For patients aged 39-70: Has the patient had a colonoscopy / FIT/ Cologuard? Yes - no Care Gap present      If the patient is female:    4. For patients aged 41-77: Has the patient had a mammogram within the past 2 years? NA - based on age or sex      11. For patients aged 21-65: Has the patient had a pap smear?  NA - based on age or sex        1 most recent PHQ Screens 7/19/2022   Little interest or pleasure in doing things Not at all   Feeling down, depressed, irritable, or hopeless Not at all   Total Score PHQ 2 0         Health Maintenance Due   Topic Date Due    Pneumococcal 0-64 years (1 - PCV) Never done    DTaP/Tdap/Td series (1 - Tdap) Never done    Foot Exam Q1  10/12/2021       Learning Assessment 5/3/2019   PRIMARY LEARNER Patient   HIGHEST LEVEL OF EDUCATION - PRIMARY LEARNER  GRADUATED HIGH SCHOOL OR GED   BARRIERS PRIMARY LEARNER NONE   CO-LEARNER CAREGIVER No   PRIMARY LANGUAGE ENGLISH   LEARNER PREFERENCE PRIMARY DEMONSTRATION   ANSWERED BY self    RELATIONSHIP SELF

## 2022-07-25 DIAGNOSIS — E11.9 CONTROLLED TYPE 2 DIABETES MELLITUS WITHOUT COMPLICATION, WITHOUT LONG-TERM CURRENT USE OF INSULIN (HCC): ICD-10-CM

## 2022-07-25 DIAGNOSIS — J30.89 ENVIRONMENTAL AND SEASONAL ALLERGIES: ICD-10-CM

## 2022-07-25 DIAGNOSIS — E78.2 MIXED HYPERLIPIDEMIA: ICD-10-CM

## 2022-07-25 RX ORDER — ATORVASTATIN CALCIUM 20 MG/1
TABLET, FILM COATED ORAL
Qty: 90 TABLET | Refills: 3 | Status: SHIPPED | OUTPATIENT
Start: 2022-07-25

## 2022-07-25 RX ORDER — CETIRIZINE HCL 10 MG
10 TABLET ORAL DAILY
Qty: 30 TABLET | Refills: 1 | Status: SHIPPED | OUTPATIENT
Start: 2022-07-25 | End: 2022-09-23 | Stop reason: SDUPTHER

## 2022-07-25 RX ORDER — METFORMIN HYDROCHLORIDE 1000 MG/1
1000 TABLET ORAL 2 TIMES DAILY WITH MEALS
Qty: 180 TABLET | Refills: 3 | Status: SHIPPED | OUTPATIENT
Start: 2022-07-25 | End: 2022-10-10 | Stop reason: SDUPTHER

## 2022-09-23 DIAGNOSIS — J30.89 ENVIRONMENTAL AND SEASONAL ALLERGIES: ICD-10-CM

## 2022-09-23 RX ORDER — CETIRIZINE HCL 10 MG
10 TABLET ORAL DAILY
Qty: 90 TABLET | Refills: 3 | Status: SHIPPED | OUTPATIENT
Start: 2022-09-23

## 2022-10-05 DIAGNOSIS — G89.29 CHRONIC LEFT-SIDED LOW BACK PAIN WITHOUT SCIATICA: ICD-10-CM

## 2022-10-05 DIAGNOSIS — M54.50 CHRONIC LEFT-SIDED LOW BACK PAIN WITHOUT SCIATICA: ICD-10-CM

## 2022-10-05 RX ORDER — CYCLOBENZAPRINE HCL 5 MG
5 TABLET ORAL
Qty: 30 TABLET | Refills: 0 | Status: SHIPPED | OUTPATIENT
Start: 2022-10-05 | End: 2022-10-05 | Stop reason: SDUPTHER

## 2022-10-05 RX ORDER — CYCLOBENZAPRINE HCL 5 MG
5 TABLET ORAL
Qty: 30 TABLET | Refills: 0 | Status: SHIPPED | OUTPATIENT
Start: 2022-10-05 | End: 2022-10-06 | Stop reason: SDUPTHER

## 2022-10-05 NOTE — TELEPHONE ENCOUNTER
Message  Received: Today  Shakira Corey Nurses Pool  Subject: Refill Request     QUESTIONS   Name of Medication? Other - cyclobenzaprine (FLEXERIL) 5 mg tablet   Patient-reported dosage and instructions? 3 TIMES DAILY AS NEEDED for   Muscle Spasm(s)   How many days do you have left? 0   Preferred Pharmacy? [address] [place] [address] #[id number]   Pharmacy phone number (if available)? [phone]   Additional Information for Provider? Spoke with patient and sent over   other request, he states that he is at the pharmacy now and would like for   this prescription to be sent over right away. Please return call to   address, thank you   ---------------------------------------------------------------------------   --------------   CALL BACK INFO   What is the best way for the office to contact you? OK to leave message on   voicemail   Preferred Call Back Phone Number? [phone]   ---------------------------------------------------------------------------   --------------   SCRIPT ANSWERS   Relationship to Patient?  Self

## 2022-10-06 DIAGNOSIS — M54.50 CHRONIC LEFT-SIDED LOW BACK PAIN WITHOUT SCIATICA: ICD-10-CM

## 2022-10-06 DIAGNOSIS — G89.29 CHRONIC LEFT-SIDED LOW BACK PAIN WITHOUT SCIATICA: ICD-10-CM

## 2022-10-06 RX ORDER — CYCLOBENZAPRINE HCL 5 MG
5 TABLET ORAL
Qty: 30 TABLET | Refills: 0 | Status: SHIPPED | OUTPATIENT
Start: 2022-10-06

## 2022-10-10 DIAGNOSIS — E11.9 CONTROLLED TYPE 2 DIABETES MELLITUS WITHOUT COMPLICATION, WITHOUT LONG-TERM CURRENT USE OF INSULIN (HCC): ICD-10-CM

## 2022-10-10 RX ORDER — METFORMIN HYDROCHLORIDE 1000 MG/1
1000 TABLET ORAL 2 TIMES DAILY WITH MEALS
Qty: 180 TABLET | Refills: 3 | Status: SHIPPED | OUTPATIENT
Start: 2022-10-10

## 2022-11-22 ENCOUNTER — OFFICE VISIT (OUTPATIENT)
Dept: FAMILY MEDICINE CLINIC | Age: 47
End: 2022-11-22
Payer: MEDICAID

## 2022-11-22 VITALS
TEMPERATURE: 98.4 F | HEART RATE: 93 BPM | WEIGHT: 260 LBS | SYSTOLIC BLOOD PRESSURE: 108 MMHG | BODY MASS INDEX: 30.08 KG/M2 | RESPIRATION RATE: 18 BRPM | DIASTOLIC BLOOD PRESSURE: 64 MMHG | HEIGHT: 78 IN | OXYGEN SATURATION: 99 %

## 2022-11-22 DIAGNOSIS — J30.89 ENVIRONMENTAL AND SEASONAL ALLERGIES: ICD-10-CM

## 2022-11-22 DIAGNOSIS — E11.9 CONTROLLED TYPE 2 DIABETES MELLITUS WITHOUT COMPLICATION, WITHOUT LONG-TERM CURRENT USE OF INSULIN (HCC): Primary | ICD-10-CM

## 2022-11-22 DIAGNOSIS — G89.29 CHRONIC LEFT-SIDED LOW BACK PAIN WITHOUT SCIATICA: ICD-10-CM

## 2022-11-22 DIAGNOSIS — Z12.11 SCREEN FOR COLON CANCER: ICD-10-CM

## 2022-11-22 DIAGNOSIS — E78.2 MIXED HYPERLIPIDEMIA: ICD-10-CM

## 2022-11-22 DIAGNOSIS — M54.50 CHRONIC LEFT-SIDED LOW BACK PAIN WITHOUT SCIATICA: ICD-10-CM

## 2022-11-22 PROCEDURE — 3044F HG A1C LEVEL LT 7.0%: CPT | Performed by: NURSE PRACTITIONER

## 2022-11-22 PROCEDURE — 99214 OFFICE O/P EST MOD 30 MIN: CPT | Performed by: NURSE PRACTITIONER

## 2022-11-22 RX ORDER — ATORVASTATIN CALCIUM 20 MG/1
TABLET, FILM COATED ORAL
Qty: 90 TABLET | Refills: 3 | Status: SHIPPED | OUTPATIENT
Start: 2022-11-22

## 2022-11-22 RX ORDER — CYCLOBENZAPRINE HCL 5 MG
5 TABLET ORAL
Qty: 30 TABLET | Refills: 0 | Status: SHIPPED | OUTPATIENT
Start: 2022-11-22

## 2022-11-22 RX ORDER — SILDENAFIL 100 MG/1
100 TABLET, FILM COATED ORAL
Qty: 10 TABLET | Refills: 3 | Status: CANCELLED | OUTPATIENT
Start: 2022-11-22

## 2022-11-22 RX ORDER — CETIRIZINE HYDROCHLORIDE 10 MG/1
10 TABLET ORAL DAILY
Qty: 90 TABLET | Refills: 3 | Status: SHIPPED | OUTPATIENT
Start: 2022-11-22

## 2022-11-22 NOTE — PROGRESS NOTES
Leonardoubaldo               [address]               [phone]      Taiwo Brantley is a 41 y.o. male and presents with Follow Up Chronic Condition, Cholesterol Problem, Hypertension, and Diabetes       Assessment/Plan:    Diagnoses and all orders for this visit:    1. Controlled type 2 diabetes mellitus without complication, without long-term current use of insulin (HCC)  -      DIABETES FOOT EXAM  -     REFERRAL TO PODIATRY  -     METABOLIC PANEL, COMPREHENSIVE; Future  -     HEMOGLOBIN A1C WITH EAG; Future  Endorses medication compliance, Follow up labs prior to next visit, Denies signs and symptoms of hyper or hypoglycemia, Referral placed, Health maintenance needs addressed, and Diabetes controlled, A1C <7     2. Mixed hyperlipidemia  -     atorvastatin (LIPITOR) 20 mg tablet; take 1 tablet by mouth once daily  -     LIPID PANEL; Future  Endorses medication compliance, Refill provided, Follow up labs prior to next visit, and Denies abdominal pain or symptoms of myalgia     3. Chronic left-sided low back pain without sciatica  -     cyclobenzaprine (FLEXERIL) 5 mg tablet; Take 1 Tablet by mouth three (3) times daily as needed for Muscle Spasm(s). Refills provided  4. Environmental and seasonal allergies  -     cetirizine (ZYRTEC) 10 mg tablet; Take 1 Tablet by mouth daily. Refills provided  5. Screen for colon cancer  -     REFERRAL FOR COLONOSCOPY      Follow-up and Dispositions    Return in about 4 months (around 3/22/2023) for DM, HLD, 15 min, office only, w/labs prior.            Health Maintenance:   Health Maintenance   Topic Date Due    Pneumococcal 0-64 years (1 - PCV) Never done    Hepatitis B Vaccine (1 of 3 - Risk 3-dose series) Never done    DTaP/Tdap/Td series (1 - Tdap) Never done    COVID-19 Vaccine (4 - Booster for Pfizer series) 05/05/2022    Flu Vaccine (1) 08/01/2022    Colorectal Cancer Screening Combo  11/01/2022    MICROALBUMIN Q1  04/06/2023    Eye Exam Retinal or Dilated  05/17/2023    Depression Screen  07/19/2023    A1C test (Diabetic or Prediabetic)  11/18/2023    Lipid Screen  11/18/2023    Foot Exam Q1  11/22/2023    Hepatitis C Screening  Completed        Subjective:    Labs obtained prior to visit? YES  Reviewed with patient? Yes    DMII-   Patient reports medication compliance Daily  Diabetic diet compliance most of the time  Patient monitors blood sugars regularly does not have a glucometer   Reports am fasting sugars range does not check   Denies hypoglycemic episodes yes  Denies polyuria, polydipsia, paraesthesia, vision changes? yes  Engaging in daily exercise?  Yes: Comment: gym, running on treadmill and weights     Diabetic Foot and Eye Exam HM Status   Topic Date Due    Eye Exam  05/17/2023    Diabetic Foot Care  11/22/2023     Hemoglobin A1c   Date Value Ref Range Status   11/18/2022 6.2 (H) 4.8 - 5.6 % Final     Comment:              Prediabetes: 5.7 - 6.4           Diabetes: >6.4           Glycemic control for adults with diabetes: <7.0     ]  Creatinine, urine random   Date Value Ref Range Status   04/06/2022 139.5 Not Estab. mg/dL Final     Microalb/Creat ratio (ug/mg creat.)   Date Value Ref Range Status   04/06/2022 6 0 - 29 mg/g creat Final     Comment:                            Normal:                0 -  29                         Moderately increased: 30 - 300                         Severely increased:       >300     10/09/2020 6 0 - 29 mg/g creat Final     Comment:                            Normal:                0 -  29                         Moderately increased: 30 - 300                         Severely increased:       >300                **Please note reference interval change**     07/16/2020 <8 0 - 29 mg/g creat Final     Comment:                            Normal:                0 -  29                         Moderately increased: 30 - 300                         Severely increased:       >300                **Please note reference interval change**       Key Antihyperglycemic Medications               metFORMIN (GLUCOPHAGE) 1,000 mg tablet (Taking) Take 1 Tablet by mouth two (2) times daily (with meals). HLD:  Has been compliant with meds  Yes  Compliant with low-fat diet. most of the time    Denies myalgias or other side effects. yes  The 10-year ASCVD risk score (Lew GREENBERG, et al., 2019) is: 9.6%    Cholesterol, total   Date Value Ref Range Status   11/18/2022 183 100 - 199 mg/dL Final     Triglyceride   Date Value Ref Range Status   11/18/2022 119 0 - 149 mg/dL Final     HDL Cholesterol   Date Value Ref Range Status   11/18/2022 48 >39 mg/dL Final     LDL, calculated   Date Value Ref Range Status   11/18/2022 114 (H) 0 - 99 mg/dL Final   ]  Key Antihyperlipidemia Meds               atorvastatin (LIPITOR) 20 mg tablet (Taking) take 1 tablet by mouth once daily               ROS:     ROS  As stated in HPI, otherwise all others negative. The problem list was updated as a part of today's visit. Patient Active Problem List   Diagnosis Code    Erectile dysfunction N52.9    Controlled type 2 diabetes mellitus without complication, without long-term current use of insulin ([facility] [address] E11.9    Pulmonary nodules R91.8    Hydradenitis L73.2    Mixed hyperlipidemia E78.2       The PSH, FH were reviewed. SH:  Social History     Tobacco Use    Smoking status: Light Smoker    Smokeless tobacco: Never    Tobacco comments:     black mild    Substance Use Topics    Alcohol use: Yes     Alcohol/week: 1.0 standard drink     Types: 1 Cans of beer per week     Comment: occ beer    Drug use: Yes     Types: Marijuana     Comment: occ       Medications/Allergies:  Current Outpatient Medications on File Prior to Visit   Medication Sig Dispense Refill    tadalafiL (CIALIS) 5 mg tablet TAKE ONE TABLET BY MOUTH DAILY AS NEEDED 30 Tablet 3    metFORMIN (GLUCOPHAGE) 1,000 mg tablet Take 1 Tablet by mouth two (2) times daily (with meals).  180 Tablet 3    sildenafil citrate (VIAGRA) 100 mg tablet Take 1 Tablet by mouth daily as needed for Erectile Dysfunction. 10 Tablet 3    Blood-Glucose Meter monitoring kit Check blood sugar daily in the morning before eating 1 Kit 0    glucose blood VI test strips (ASCENSIA AUTODISC VI, ONE TOUCH ULTRA TEST VI) strip Check blood sugar daily in the morning before eating 50 Strip 6    lancets misc Check glucose daily in morning before eating 100 Each 11    [DISCONTINUED] cyclobenzaprine (FLEXERIL) 5 mg tablet Take 1 Tablet by mouth three (3) times daily as needed for Muscle Spasm(s). 30 Tablet 0    [DISCONTINUED] cetirizine (ZYRTEC) 10 mg tablet Take 1 Tablet by mouth daily. 90 Tablet 3    [DISCONTINUED] atorvastatin (LIPITOR) 20 mg tablet take 1 tablet by mouth once daily 90 Tablet 3     No current facility-administered medications on file prior to visit. Allergies   Allergen Reactions    Chlorpheniramine-Phenylpropan Runny Nose    Pollen Extracts Sneezing       Objective:  Visit Vitals  /64   Pulse 93   Temp 98.4 °F (36.9 °C) (Temporal)   Resp 18   Ht 6' 8\" (2.032 m)   Wt 260 lb (117.9 kg)   SpO2 99%   BMI 28.56 kg/m²    Body mass index is 28.56 kg/m². Physical assessment  Physical Exam  Vitals and nursing note reviewed. Eyes:      Conjunctiva/sclera: Conjunctivae normal.      Pupils: Pupils are equal, round, and reactive to light. Cardiovascular:      Rate and Rhythm: Normal rate and regular rhythm. Heart sounds: Normal heart sounds. No murmur heard. No friction rub. No gallop. Pulmonary:      Effort: Pulmonary effort is normal.      Breath sounds: Normal breath sounds. Musculoskeletal:         General: Normal range of motion. Cervical back: Normal range of motion.    Feet:      Comments: Diabetic foot exam:     Left: Reflexes 2+     Vibratory sensation normal    Proprioception normal   Filament test normal sensation with micro filament   Pulse DP: 2+ (normal)   Deformities: None  Right: Reflexes 2+   Vibratory sensation normal   Proprioception normal   Filament test normal sensation with micro filament   Pulse DP: 2+ (normal)   Deformities: None    Skin:     General: Skin is warm and dry. Neurological:      Mental Status: He is alert. Labwork and Ancillary Studies:    CBC w/Diff  Lab Results   Component Value Date/Time    WBC 6.6 05/04/2019 12:00 AM    HGB 13.0 05/04/2019 12:00 AM    PLATELET 651 16/79/9248 12:00 AM         Basic Metabolic Profile  Lab Results   Component Value Date/Time    Sodium 143 11/18/2022 11:30 AM    Potassium 4.4 11/18/2022 11:30 AM    Chloride 102 11/18/2022 11:30 AM    CO2 27 11/18/2022 11:30 AM    Glucose 104 (H) 11/18/2022 11:30 AM    BUN 11 11/18/2022 11:30 AM    Creatinine 1.07 11/18/2022 11:30 AM    BUN/Creatinine ratio 10 11/18/2022 11:30 AM    GFR est  08/11/2021 11:11 AM    GFR est non- 08/11/2021 11:11 AM    Calcium 9.3 11/18/2022 11:30 AM        Cholesterol  Lab Results   Component Value Date/Time    Cholesterol, total 183 11/18/2022 11:30 AM    HDL Cholesterol 48 11/18/2022 11:30 AM    LDL, calculated 114 (H) 11/18/2022 11:30 AM    LDL, calculated 73 07/16/2020 11:11 AM    Triglyceride 119 11/18/2022 11:30 AM           I have discussed the diagnosis with the patient and the intended plan as seen in the above orders. The patient has received an After-Visit Summary and questions were answered concerning future plans. An After Visit Summary was printed and given to the patient. All diagnosis have been discussed with the patient and all of the patient's questions have been answered. Follow-up and Dispositions    Return in about 4 months (around 3/22/2023) for DM, HLD, 15 min, office only, w/labs prior. Harman Boast, HUMERAP-BC  [address] [facility] [address]  [address]

## 2022-11-22 NOTE — PROGRESS NOTES
Room 7     When asked if patient has any concerns he would like to address with ABHINAV Reagan patient states No       Did patient bring someone? No    Did the patient have DME equipment? No     Did you take your medication today? Yes       1. \"Have you been to the ER, urgent care clinic since your last visit? Hospitalized since your last visit? \" No    2. \"Have you seen or consulted any other health care providers outside of the [address] [place] [address] since your last visit? \" No     3. For patients aged 39-70: Has the patient had a colonoscopy / FIT/ Cologuard? No      If the patient is female:    4. For patients aged 41-77: Has the patient had a mammogram within the past 2 years? NA - based on age or sex      11. For patients aged 21-65: Has the patient had a pap smear?  NA - based on age or sex        3 most recent PHQ Screens 11/22/2022   Little interest or pleasure in doing things Not at all   Feeling down, depressed, irritable, or hopeless Not at all   Total Score PHQ 2 0         Health Maintenance Due   Topic Date Due    Pneumococcal 0-64 years (1 - PCV) Never done    Hepatitis B Vaccine (1 of 3 - Risk 3-dose series) Never done    DTaP/Tdap/Td series (1 - Tdap) Never done    Foot Exam Q1  10/12/2021    COVID-19 Vaccine (4 - Booster for Pfizer series) 05/05/2022    Flu Vaccine (1) 08/01/2022    Colorectal Cancer Screening Combo  11/01/2022       Learning Assessment 5/3/2019   PRIMARY LEARNER Patient   HIGHEST LEVEL OF EDUCATION - PRIMARY LEARNER  GRADUATED HIGH SCHOOL OR GED   BARRIERS PRIMARY LEARNER NONE   CO-LEARNER CAREGIVER No   PRIMARY LANGUAGE ENGLISH   LEARNER PREFERENCE PRIMARY DEMONSTRATION   ANSWERED BY self    RELATIONSHIP SELF

## 2022-12-06 ENCOUNTER — VIRTUAL VISIT (OUTPATIENT)
Dept: FAMILY MEDICINE CLINIC | Age: 47
End: 2022-12-06

## 2022-12-06 NOTE — PROGRESS NOTES
Sent text x2 and called 3 times, no response  Cancel appointment. This encounter was created in error - please disregard.

## 2022-12-06 NOTE — PROGRESS NOTES
Reji use this number [phone]. When asked if patient has seen the Podiatrist , Sleep doctor for snoring, and the Gastroenterologist for Colonoscopy patient states no . Patient has been informed to call the offices back to reschedule an appointment . Patient verbalized understanding. When asked if patient has any concerns he would like to address with ABHINAV Reagan patient states yes, no . Did patient bring someone? No    Did the patient have DME equipment? No     Did you take your medication today? Yes      1. \"Have you been to the ER, urgent care clinic since your last visit? Hospitalized since your last visit? \" No    2. \"Have you seen or consulted any other health care providers outside of the [address] [place] [address] since your last visit? \" No     3. For patients aged 39-70: Has the patient had a colonoscopy / FIT/ Cologuard? No Patient states I have to schedule an appointment. If the patient is female:    4. For patients aged 41-77: Has the patient had a mammogram within the past 2 years? NA - based on age or sex      11. For patients aged 21-65: Has the patient had a pap smear?  NA - based on age or sex        3 most recent PHQ Screens 12/6/2022   Little interest or pleasure in doing things Not at all   Feeling down, depressed, irritable, or hopeless Not at all   Total Score PHQ 2 0         Health Maintenance Due   Topic Date Due    Pneumococcal 0-64 years (1 - PCV) Never done    Hepatitis B Vaccine (1 of 3 - Risk 3-dose series) Never done    DTaP/Tdap/Td series (1 - Tdap) Never done    COVID-19 Vaccine (4 - Booster for Pfizer series) 05/05/2022    Flu Vaccine (1) 08/01/2022    Colorectal Cancer Screening Combo  Never done       Learning Assessment 5/3/2019   PRIMARY LEARNER Patient   HIGHEST LEVEL OF EDUCATION - PRIMARY LEARNER  GRADUATED HIGH SCHOOL OR GED   BARRIERS PRIMARY LEARNER NONE   CO-LEARNER CAREGIVER No   PRIMARY LANGUAGE ENGLISH   LEARNER PREFERENCE PRIMARY DEMONSTRATION ANSWERED BY self    RELATIONSHIP SELF

## 2023-01-03 DIAGNOSIS — M54.50 CHRONIC LEFT-SIDED LOW BACK PAIN WITHOUT SCIATICA: ICD-10-CM

## 2023-01-03 DIAGNOSIS — G89.29 CHRONIC LEFT-SIDED LOW BACK PAIN WITHOUT SCIATICA: ICD-10-CM

## 2023-01-03 RX ORDER — CYCLOBENZAPRINE HCL 5 MG
5 TABLET ORAL
Qty: 30 TABLET | Refills: 0 | Status: SHIPPED | OUTPATIENT
Start: 2023-01-03

## 2023-01-12 ENCOUNTER — VIRTUAL VISIT (OUTPATIENT)
Dept: FAMILY MEDICINE CLINIC | Age: 48
End: 2023-01-12

## 2023-01-12 NOTE — PROGRESS NOTES
When asked if patient has any concerns he would like to address with ABHINAV Reagan patient states yes this started 2-3 days and I am having a lot of mucus . Did patient bring someone? No      Did you take your medication today? Yes       1. \"Have you been to the ER, urgent care clinic since your last visit? Hospitalized since your last visit? \" No    2. \"Have you seen or consulted any other health care providers outside of the [address] [place] [address] since your last visit? \" No     3. For patients aged 39-70: Has the patient had a colonoscopy / FIT/ Cologuard? No      If the patient is female:    4. For patients aged 41-77: Has the patient had a mammogram within the past 2 years? NA - based on age or sex      11. For patients aged 21-65: Has the patient had a pap smear?  NA - based on age or sex        3 most recent PHQ Screens 1/12/2023   Little interest or pleasure in doing things Not at all   Feeling down, depressed, irritable, or hopeless Not at all   Total Score PHQ 2 0         Health Maintenance Due   Topic Date Due    Pneumococcal 0-64 years (1 - PCV) Never done    Hepatitis B Vaccine (1 of 3 - Risk 3-dose series) Never done    DTaP/Tdap/Td series (1 - Tdap) Never done    COVID-19 Vaccine (4 - Booster for Pfizer series) 05/05/2022    Flu Vaccine (1) 08/01/2022    Colorectal Cancer Screening Combo  Never done       Learning Assessment 5/3/2019   PRIMARY LEARNER Patient   HIGHEST LEVEL OF EDUCATION - PRIMARY LEARNER  GRADUATED HIGH SCHOOL OR GED   BARRIERS PRIMARY LEARNER NONE   CO-LEARNER CAREGIVER No   PRIMARY LANGUAGE ENGLISH   LEARNER PREFERENCE PRIMARY DEMONSTRATION   ANSWERED BY self    RELATIONSHIP SELF

## 2023-01-12 NOTE — PROGRESS NOTES
Leonardoubaldo               [address]               [phone]      Terrance Panda is a 41 y.o. male who was seen by synchronous (real-time) audio-video technology on 1/12/2023 for No chief complaint on file. Assessment & Plan:   {There are no diagnoses linked to this encounter. (Refresh or delete this SmartLink)}        712  Subjective:     ***    Prior to Admission medications    Medication Sig Start Date End Date Taking? Authorizing Provider   cyclobenzaprine (FLEXERIL) 5 mg tablet Take 1 Tablet by mouth three (3) times daily as needed for Muscle Spasm(s). 1/3/23  Yes Travon Regalado NP   sildenafil citrate (VIAGRA) 100 mg tablet TAKE ONE TABLET BY MOUTH DAILY AS NEEDED FOR ERECTILE DYSFUNCTION --MAX OF 1 TABLET PER DAY 11/29/22  Yes Nataliia PatientTERRI   atorvastatin (LIPITOR) 20 mg tablet take 1 tablet by mouth once daily 11/22/22  Yes Travon Regalado NP   cetirizine (ZYRTEC) 10 mg tablet Take 1 Tablet by mouth daily. 11/22/22  Yes Travon Regalado NP   metFORMIN (GLUCOPHAGE) 1,000 mg tablet Take 1 Tablet by mouth two (2) times daily (with meals).  10/10/22  Yes Travon Regalado NP   tadalafiL (CIALIS) 5 mg tablet TAKE ONE TABLET BY MOUTH DAILY AS NEEDED  Patient not taking: Reported on 1/12/2023 10/19/22   Nataliia Patient, NP   Blood-Glucose Meter monitoring kit Check blood sugar daily in the morning before eating  Patient not taking: Reported on 1/12/2023 5/10/19   Travon Regalado NP   glucose blood VI test strips (ASCENSIA AUTODISC VI, ONE TOUCH ULTRA TEST VI) strip Check blood sugar daily in the morning before eating  Patient not taking: Reported on 1/12/2023 5/10/19   Travon Regalado NP   lancets misc Check glucose daily in morning before eating  Patient not taking: Reported on 1/12/2023 5/10/19   Travon Regalado NP     Patient Active Problem List   Diagnosis Code    Erectile dysfunction N52.9    Controlled type 2 diabetes mellitus without complication, without long-term current use of insulin ([facility] [address] E11.9    Pulmonary nodules R91.8    Hydradenitis L73.2    Mixed hyperlipidemia E78.2     History reviewed. No pertinent surgical history. Family History   Problem Relation Age of Onset    Diabetes Mother     Hypertension Mother      Social History     Tobacco Use    Smoking status: Light Smoker    Smokeless tobacco: Never    Tobacco comments:     black mild    Substance Use Topics    Alcohol use: Yes     Alcohol/week: 1.0 standard drink     Types: 1 Cans of beer per week     Comment: occ beer       ROS  As stated in HPI, otherwise all others negative. Objective:   No flowsheet data found. General: alert, cooperative, no distress   Mental  status: normal mood, behavior, speech, dress, motor activity, and thought processes, able to follow commands   HENT: NCAT   Neck: no visualized mass   Resp: no respiratory distress   Neuro: no gross deficits   Skin: no discoloration or lesions of concern on visible areas   Psychiatric: normal affect, consistent with stated mood, no evidence of hallucinations     Additional exam findings: We discussed the expected course, resolution and complications of the diagnosis(es) in detail. Medication risks, benefits, costs, interactions, and alternatives were discussed as indicated. I advised him to contact the office if his condition worsens, changes or fails to improve as anticipated. He expressed understanding with the diagnosis(es) and plan. Tee Recinos, was evaluated through a synchronous (real-time) audio-video encounter. The patient (or guardian if applicable) is aware that this is a billable service, which includes applicable co-pays. Verbal consent to proceed has been obtained.  The visit was conducted pursuant to the emergency declaration under the [facility] [address] authority and the [place] [employer] and [employer] [facility] Act.  Patient identification was verified, and a caregiver was present when appropriate. The patient was located at home in a state where the provider was licensed to provide care. Kelton Lam was evaluated through a synchronous (real-time) audio-video encounter. The patient (or guardian if applicable) is aware that this is a billable service, which includes applicable co-pays. This Virtual Visit was conducted with patient's (and/or legal guardian's) consent. The visit was conducted pursuant to the emergency declaration under the [facility], [address] [place] [address] authority and the [employer] Act. Patient identification was verified, and a caregiver was present when appropriate. The patient was located at: Home: [address]  The provider was located at: Facility (Appt Department): [address]  [address] [facility] [address] [place] [id number] [address] [place].   [address]

## 2023-01-27 DIAGNOSIS — E11.9 CONTROLLED TYPE 2 DIABETES MELLITUS WITHOUT COMPLICATION, WITHOUT LONG-TERM CURRENT USE OF INSULIN (HCC): ICD-10-CM

## 2023-01-27 RX ORDER — METFORMIN HYDROCHLORIDE 1000 MG/1
1000 TABLET ORAL 2 TIMES DAILY WITH MEALS
Qty: 180 TABLET | Refills: 3 | Status: SHIPPED | OUTPATIENT
Start: 2023-01-27

## 2023-02-27 ENCOUNTER — OFFICE VISIT (OUTPATIENT)
Facility: CLINIC | Age: 48
End: 2023-02-27
Payer: MEDICAID

## 2023-02-27 VITALS
HEIGHT: 78 IN | DIASTOLIC BLOOD PRESSURE: 71 MMHG | HEART RATE: 91 BPM | WEIGHT: 268 LBS | OXYGEN SATURATION: 96 % | BODY MASS INDEX: 31.01 KG/M2 | SYSTOLIC BLOOD PRESSURE: 132 MMHG | RESPIRATION RATE: 18 BRPM | TEMPERATURE: 98 F

## 2023-02-27 DIAGNOSIS — L70.0 ACNE VULGARIS: ICD-10-CM

## 2023-02-27 DIAGNOSIS — E78.2 MIXED HYPERLIPIDEMIA: ICD-10-CM

## 2023-02-27 DIAGNOSIS — E11.9 CONTROLLED TYPE 2 DIABETES MELLITUS WITHOUT COMPLICATION, WITHOUT LONG-TERM CURRENT USE OF INSULIN (HCC): Primary | ICD-10-CM

## 2023-02-27 DIAGNOSIS — Z11.3 SCREEN FOR STD (SEXUALLY TRANSMITTED DISEASE): ICD-10-CM

## 2023-02-27 PROBLEM — L73.2 HYDRADENITIS: Status: ACTIVE | Noted: 2019-10-15

## 2023-02-27 PROBLEM — R91.8 PULMONARY NODULES: Status: ACTIVE | Noted: 2018-11-20

## 2023-02-27 PROCEDURE — 99214 OFFICE O/P EST MOD 30 MIN: CPT | Performed by: NURSE PRACTITIONER

## 2023-02-27 RX ORDER — CLINDAMYCIN PHOSPHATE 11.9 MG/ML
SOLUTION TOPICAL
Qty: 30 ML | Refills: 2 | Status: SHIPPED | OUTPATIENT
Start: 2023-02-27 | End: 2023-03-29

## 2023-02-27 SDOH — ECONOMIC STABILITY: TRANSPORTATION INSECURITY
IN THE PAST 12 MONTHS, HAS THE LACK OF TRANSPORTATION KEPT YOU FROM MEDICAL APPOINTMENTS OR FROM GETTING MEDICATIONS?: NO

## 2023-02-27 SDOH — ECONOMIC STABILITY: INCOME INSECURITY: IN THE LAST 12 MONTHS, WAS THERE A TIME WHEN YOU WERE NOT ABLE TO PAY THE MORTGAGE OR RENT ON TIME?: NO

## 2023-02-27 SDOH — ECONOMIC STABILITY: HOUSING INSECURITY
IN THE LAST 12 MONTHS, WAS THERE A TIME WHEN YOU DID NOT HAVE A STEADY PLACE TO SLEEP OR SLEPT IN A SHELTER (INCLUDING NOW)?: NO

## 2023-02-27 SDOH — ECONOMIC STABILITY: TRANSPORTATION INSECURITY
IN THE PAST 12 MONTHS, HAS LACK OF TRANSPORTATION KEPT YOU FROM MEETINGS, WORK, OR FROM GETTING THINGS NEEDED FOR DAILY LIVING?: NO

## 2023-02-27 SDOH — ECONOMIC STABILITY: FOOD INSECURITY: WITHIN THE PAST 12 MONTHS, THE FOOD YOU BOUGHT JUST DIDN'T LAST AND YOU DIDN'T HAVE MONEY TO GET MORE.: NEVER TRUE

## 2023-02-27 SDOH — ECONOMIC STABILITY: FOOD INSECURITY: WITHIN THE PAST 12 MONTHS, YOU WORRIED THAT YOUR FOOD WOULD RUN OUT BEFORE YOU GOT MONEY TO BUY MORE.: NEVER TRUE

## 2023-02-27 ASSESSMENT — PATIENT HEALTH QUESTIONNAIRE - PHQ9
1. LITTLE INTEREST OR PLEASURE IN DOING THINGS: 0
SUM OF ALL RESPONSES TO PHQ QUESTIONS 1-9: 0
SUM OF ALL RESPONSES TO PHQ QUESTIONS 1-9: 0
2. FEELING DOWN, DEPRESSED OR HOPELESS: 0
SUM OF ALL RESPONSES TO PHQ9 QUESTIONS 1 & 2: 0
SUM OF ALL RESPONSES TO PHQ QUESTIONS 1-9: 0
SUM OF ALL RESPONSES TO PHQ QUESTIONS 1-9: 0

## 2023-02-27 ASSESSMENT — SOCIAL DETERMINANTS OF HEALTH (SDOH): HOW HARD IS IT FOR YOU TO PAY FOR THE VERY BASICS LIKE FOOD, HOUSING, MEDICAL CARE, AND HEATING?: NOT HARD AT ALL

## 2023-02-27 NOTE — PROGRESS NOTES
[address]               [phone]      Jose R Torres is a 41 y.o. male and presents with Other (Bumps on the right side of face )       Assessment/Plan:    1. Controlled type 2 diabetes mellitus without complication, without long-term current use of insulin (HCC)  -     Comprehensive Metabolic Panel; Future  -     Hemoglobin A1C; Future  -     Microalbumin / Creatinine Urine Ratio; Future  -     Comprehensive Metabolic Panel; Future  -     Hemoglobin A1C; Future  Endorses medication compliance, Follow up labs prior to next visit, Follow-up labs today, Denies signs and symptoms of hyper or hypoglycemia, and Diabetes controlled, A1C <7  2. Mixed hyperlipidemia  -     Lipid Panel; Future  -     Lipid Panel; Future  Endorses medication compliance, Follow up labs prior to next visit, Follow-up labs today, and Denies abdominal pain or symptoms of myalgia  3. Acne vulgaris  -     clindamycin (CLEOCIN-T) 1 % external solution; Apply topically 2 times daily. , Disp-30 mL, R-2, Normal  Bumps on his face are ingrown hairs with sebum in the follicule, treat with cleocin  If no resolve refer to derm  Advised this is most likely due to his recent shaving of his beard  4. Screen for STD (sexually transmitted disease)  -     Chlamydia, Gonorrhea, Trichomoniasis; Future   Per his request, STD testing performed    Follow up and disposition:   Return in about 4 months (around 6/27/2023) for DM, HLD, HTN, 15min, office, w/labsprior.       Health Maintenance:   Health Maintenance   Topic Date Due    Pneumococcal 0-64 years Vaccine (1 - PCV) Never done    Hepatitis B vaccine (1 of 3 - Risk 3-dose series) Never done    DTaP/Tdap/Td vaccine (1 - Tdap) Never done    COVID-19 Vaccine (4 - Booster for Pfizer series) 05/05/2022    Diabetic retinal exam  05/17/2022    Flu vaccine (1) 08/01/2022    Colorectal Cancer Screen  Never done    Diabetic Alb to Cr ratio (uACR) test  04/06/2023 A1C test (Diabetic or Prediabetic)  11/18/2023    Lipids  11/18/2023    GFR test (Diabetes, CKD 3-4, OR last GFR 15-59)  11/18/2023    Diabetic foot exam  11/22/2023    Depression Screen  01/12/2024    Hepatitis C screen  Completed    HIV screen  Completed    Hepatitis A vaccine  Aged Out    Hib vaccine  Aged Out    Meningococcal (ACWY) vaccine  Aged Out        Subjective:    Labs obtained prior to visit? No  Reviewed with patient? N/A    Bumps on face  Located at ingrown hairs  States he removes the hair and then there is oil he can squeeze out  Started about 3 weeks ago, he had to shave his beard    STD screen  Is having a lot for sexual intercourse with different women  Does not always use a condom  Denies any symptoms: burning with urination or discharge from penis    DMII-   Patient reports medication compliance Yes  Diabetic diet compliance frequently  Patient monitors blood sugars regularly never   Home glucose readings: does not check   Denies hypoglycemic episodes Yes  Denies polyuria, polydipsia, paraesthesia, vision changes? Yes  Engaging in daily exercise? None     Diabetes Management   Topic Date Due    Diabetic retinal exam  05/17/2022     Lab Results   Component Value Date/Time    LABA1C 6.2 11/18/2022 11:30 AM   ]  Lab Results   Component Value Date/Time    MALBCR 6 04/06/2022 08:21 AM   ]  Diabetic medications:   Key Antihyperglycemic Medications            metFORMIN (GLUCOPHAGE) 1000 MG tablet (Taking)    Class: Historical Med         HLD:  Has been compliant with meds  Yes  Compliant with low-fat diet. most of the time    Denies myalgias or other side effects.  yes  The 10-year ASCVD risk score (Tomasa MICHELLE, et al., 2019) is: 13.5%    Lab Results   Component Value Date/Time    TRIG 119 11/18/2022 11:30 AM    CHOL 183 11/18/2022 11:30 AM    LDLCALC 114 11/18/2022 11:30 AM    HDL 48 11/18/2022 11:30 AM     Key Hyperlipidemia Meds            atorvastatin (LIPITOR) 20 MG tablet (Taking)    Class: Historical Med               ROS:     Review of Systems  As stated in HPI, otherwise all others negative. The problem list was updated as a part of today's visit. Patient Active Problem List   Diagnosis    Pulmonary nodules    Erectile dysfunction    Hydradenitis    Mixed hyperlipidemia    Controlled type 2 diabetes mellitus without complication, without long-term current use of insulin (HCC)       The PSH, FH were reviewed. SH:  Social History     Tobacco Use    Smoking status: Light Smoker    Smokeless tobacco: Never    Tobacco comments:     Quit smoking: black mild   Substance Use Topics    Alcohol use: Yes     Alcohol/week: 1.0 standard drink    Drug use: Yes     Types: Marijuana Espiridion Matte)       Medications/Allergies:  Current Outpatient Medications on File Prior to Visit   Medication Sig Dispense Refill    Lancets MISC Check glucose daily in morning before eating      atorvastatin (LIPITOR) 20 MG tablet take 1 tablet by mouth once daily      cetirizine (ZYRTEC) 10 MG tablet Take 10 mg by mouth daily      cyclobenzaprine (FLEXERIL) 5 MG tablet Take 5 mg by mouth 3 times daily as needed      metFORMIN (GLUCOPHAGE) 1000 MG tablet Take 1,000 mg by mouth 2 times daily (with meals)      sildenafil (VIAGRA) 100 MG tablet TAKE ONE TABLET BY MOUTH DAILY AS NEEDED FOR ERECTILE DYSFUNCTION --MAX OF 1 TABLET PER DAY       No current facility-administered medications on file prior to visit. Allergies   Allergen Reactions    Chlorpheniramine-Phenylephrine      Other reaction(s): nasal symptoms, Runny Nose    Pollen Extract      Other reaction(s): Sneezing       Objective:  /71 Comment: feet flat on floor  Pulse 91   Temp 98 °F (36.7 °C) (Temporal)   Resp 18   Ht 6' 8\" (2.032 m)   Wt 268 lb (121.6 kg)   SpO2 96%   BMI 29.44 kg/m²  Body mass index is 29.44 kg/m². Physical assessment  Physical Exam  Vitals and nursing note reviewed. Constitutional:       Appearance: Normal appearance.    HENT: Head: Normocephalic and atraumatic. Eyes:      Pupils: Pupils are equal, round, and reactive to light. Cardiovascular:      Rate and Rhythm: Normal rate and regular rhythm. Heart sounds: Normal heart sounds. No murmur heard. No friction rub. No gallop. Pulmonary:      Effort: Pulmonary effort is normal. No respiratory distress. Breath sounds: Normal breath sounds. Neurological:      Mental Status: He is alert and oriented to person, place, and time. Psychiatric:         Mood and Affect: Mood normal.         Behavior: Behavior normal.         Thought Content: Thought content normal.         Judgment: Judgment normal.               I have discussed the diagnosis with the patient and the intended plan as seen in the above orders. The patient has received an After-Visit Summary and questions were answered concerning future plans. An After Visit Summary was printed and given to the patient. All diagnosis have been discussed with the patient and all of the patient's questions have been answered. Sabas Juarez, AGNP-BC  [address]  [id number]

## 2023-02-27 NOTE — PROGRESS NOTES
Room 7      When asked if patient has any concerns he would like to address with N.P. Darien Moritz patient states yes I am requesting STD testing and allergy nasal spray. Patient states I have bumps on my face that appeared 3 weeks ago  and I popped the bumps . Did patient bring someone? No     Did the patient have DME equipment? No     Did you take your medication today? Yes       1. \"Have you been to the ER, urgent care clinic since your last visit? Hospitalized since your last visit? \" No     2. \"Have you seen or consulted any other health care providers outside of the [address] [place] [address] since your last visit? \" No     3. For patients aged 39-70: Has the patient had a colonoscopy / FIT/ Cologuard? No I have an appointment win march       If the patient is female:    4. For patients aged 41-77: Has the patient had a mammogram within the past 2 years? N/A      5. For patients aged 21-65: Has the patient had a pap smear? {Cancer Care Gap present? N/A      PHQ-9  2/27/2023   Little interest or pleasure in doing things 0   Little interest or pleasure in doing things -   Feeling down, depressed, or hopeless 0   PHQ-2 Score 0   Total Score PHQ 2 -   PHQ-9 Total Score 0          Health Maintenance Due   Topic Date Due    Pneumococcal 0-64 years Vaccine (1 - PCV) Never done    Hepatitis B vaccine (1 of 3 - Risk 3-dose series) Never done    DTaP/Tdap/Td vaccine (1 - Tdap) Never done    COVID-19 Vaccine (4 - Booster for Pfizer series) 05/05/2022    Diabetic retinal exam  05/17/2022    Flu vaccine (1) 08/01/2022    Colorectal Cancer Screen  Never done         Who is the primary learner? Patient    What is the preferred language for health care of the primary learner? ENGLISH    How does the primary learner prefer to learn new concepts?  DEMONSTRATION    Answered By Patient    Relationship to Learner SELF

## 2023-02-28 LAB
ALBUMIN SERPL-MCNC: 4.2 G/DL (ref 4–5)
ALBUMIN/CREAT UR: <7 MG/G CREAT (ref 0–29)
ALBUMIN/GLOB SERPL: 1.6 {RATIO} (ref 1.2–2.2)
ALP SERPL-CCNC: 65 IU/L (ref 44–121)
ALT SERPL-CCNC: 33 IU/L (ref 0–44)
AST SERPL-CCNC: 25 IU/L (ref 0–40)
BILIRUB SERPL-MCNC: <0.2 MG/DL (ref 0–1.2)
BUN SERPL-MCNC: 16 MG/DL (ref 6–24)
BUN/CREAT SERPL: 17 (ref 9–20)
C TRACH RRNA SPEC QL NAA+PROBE: NEGATIVE
CALCIUM SERPL-MCNC: 9.3 MG/DL (ref 8.7–10.2)
CHLORIDE SERPL-SCNC: 103 MMOL/L (ref 96–106)
CHOLEST SERPL-MCNC: 165 MG/DL (ref 100–199)
CO2 SERPL-SCNC: 25 MMOL/L (ref 20–29)
CREAT SERPL-MCNC: 0.95 MG/DL (ref 0.76–1.27)
CREAT UR-MCNC: 44.8 MG/DL
EGFRCR SERPLBLD CKD-EPI 2021: 101 ML/MIN/1.73
GLOBULIN SER CALC-MCNC: 2.6 G/DL (ref 1.5–4.5)
GLUCOSE SERPL-MCNC: 147 MG/DL (ref 70–99)
HBA1C MFR BLD: 6.4 % (ref 4.8–5.6)
HDLC SERPL-MCNC: 43 MG/DL
LDLC SERPL CALC-MCNC: 97 MG/DL (ref 0–99)
MICROALBUMIN UR-MCNC: <3 UG/ML
N GONORRHOEA RRNA SPEC QL NAA+PROBE: NEGATIVE
POTASSIUM SERPL-SCNC: 3.9 MMOL/L (ref 3.5–5.2)
PROT SERPL-MCNC: 6.8 G/DL (ref 6–8.5)
SODIUM SERPL-SCNC: 140 MMOL/L (ref 134–144)
T VAGINALIS RRNA SPEC QL NAA+PROBE: NEGATIVE
TRIGL SERPL-MCNC: 143 MG/DL (ref 0–149)
VLDLC SERPL CALC-MCNC: 25 MG/DL (ref 5–40)

## 2023-03-02 ENCOUNTER — TELEPHONE (OUTPATIENT)
Facility: CLINIC | Age: 48
End: 2023-03-02

## 2023-03-06 RX ORDER — SILDENAFIL 100 MG/1
TABLET, FILM COATED ORAL
Qty: 10 TABLET | Refills: 2 | OUTPATIENT
Start: 2023-03-06

## 2023-03-06 RX ORDER — ATORVASTATIN CALCIUM 20 MG/1
TABLET, FILM COATED ORAL
Qty: 30 TABLET | OUTPATIENT
Start: 2023-03-06

## 2023-03-08 ENCOUNTER — TELEPHONE (OUTPATIENT)
Facility: CLINIC | Age: 48
End: 2023-03-08

## 2023-03-08 NOTE — TELEPHONE ENCOUNTER
1st attempt calling patient in reguards of message stating to schedule patient a follow-up appointment. Patient did not answer . Left message stating to call the office back.

## 2023-04-07 ENCOUNTER — TELEPHONE (OUTPATIENT)
Facility: CLINIC | Age: 48
End: 2023-04-07

## 2023-04-19 ENCOUNTER — OFFICE VISIT (OUTPATIENT)
Facility: CLINIC | Age: 48
End: 2023-04-19
Payer: MEDICAID

## 2023-04-19 VITALS
HEART RATE: 84 BPM | OXYGEN SATURATION: 98 % | RESPIRATION RATE: 18 BRPM | BODY MASS INDEX: 31.12 KG/M2 | SYSTOLIC BLOOD PRESSURE: 135 MMHG | WEIGHT: 269 LBS | HEIGHT: 78 IN | TEMPERATURE: 97.8 F | DIASTOLIC BLOOD PRESSURE: 74 MMHG

## 2023-04-19 DIAGNOSIS — S93.602S FOOT SPRAIN, LEFT, SEQUELA: Primary | ICD-10-CM

## 2023-04-19 DIAGNOSIS — L70.9 ACNE, UNSPECIFIED ACNE TYPE: ICD-10-CM

## 2023-04-19 DIAGNOSIS — L73.2 HYDRADENITIS: ICD-10-CM

## 2023-04-19 PROBLEM — E78.2 MIXED HYPERLIPIDEMIA: Status: ACTIVE | Noted: 2019-10-24

## 2023-04-19 PROBLEM — E11.9 CONTROLLED TYPE 2 DIABETES MELLITUS WITHOUT COMPLICATION, WITHOUT LONG-TERM CURRENT USE OF INSULIN (HCC): Status: ACTIVE | Noted: 2018-10-12

## 2023-04-19 PROCEDURE — 99214 OFFICE O/P EST MOD 30 MIN: CPT | Performed by: NURSE PRACTITIONER

## 2023-04-19 RX ORDER — DOXYCYCLINE HYCLATE 100 MG
100 TABLET ORAL 2 TIMES DAILY
Qty: 14 TABLET | Refills: 0 | Status: SHIPPED | OUTPATIENT
Start: 2023-04-19 | End: 2023-04-26

## 2023-04-19 RX ORDER — FLUTICASONE PROPIONATE 50 MCG
1 SPRAY, SUSPENSION (ML) NASAL DAILY
COMMUNITY

## 2023-04-19 ASSESSMENT — PATIENT HEALTH QUESTIONNAIRE - PHQ9
2. FEELING DOWN, DEPRESSED OR HOPELESS: 0
1. LITTLE INTEREST OR PLEASURE IN DOING THINGS: 0
SUM OF ALL RESPONSES TO PHQ9 QUESTIONS 1 & 2: 0
SUM OF ALL RESPONSES TO PHQ QUESTIONS 1-9: 0

## 2023-04-19 NOTE — PROGRESS NOTES
Room 7     When asked if patient has any concerns he would like to address with ANA Benavides patient states yes my left foot is still swelling . Did patient bring someone? No     Did the patient have DME equipment? No     Did you take your medication today? Yes       1. \"Have you been to the ER, urgent care clinic since your last visit? Hospitalized since your last visit? \" No     2. \"Have you seen or consulted any other health care providers outside of the [address] [place] [address] since your last visit? \" No     3. For patients aged 39-70: Has the patient had a colonoscopy / FIT/ Cologuard? No Patient sates I have an appointment for may and July . If the patient is female:    4. For patients aged 41-77: Has the patient had a mammogram within the past 2 years? N/A      5. For patients aged 21-65: Has the patient had a pap smear? {Cancer Care Gap present?  N/A      PHQ-9  4/19/2023   Little interest or pleasure in doing things 0   Little interest or pleasure in doing things -   Feeling down, depressed, or hopeless 0   PHQ-2 Score 0   Total Score PHQ 2 -   PHQ-9 Total Score 0          Health Maintenance Due   Topic Date Due    Pneumococcal 0-64 years Vaccine (1 - PCV) Never done    Hepatitis B vaccine (1 of 3 - Risk 3-dose series) Never done    DTaP/Tdap/Td vaccine (1 - Tdap) Never done    COVID-19 Vaccine (4 - Booster for Pfizer series) 05/05/2022    Diabetic retinal exam  05/17/2022    Colorectal Cancer Screen  Never done

## 2023-04-19 NOTE — PATIENT INSTRUCTIONS
Voltaren/diclofenac topical gel for pain  Wear an ankle brace or ace wrap your foot daily, while awake  You have been referred to dermatology for your face and sweat glands, you went to [facility] in the past

## 2023-04-19 NOTE — PROGRESS NOTES
[address]               [phone]      Romelia Armijo is a 41 y.o. male and presents with Foot Swelling (Patient states left foot/)       Assessment/Plan:    1. Foot sprain, left, sequela  -     [facility] - MITCHEL Acevedo MD, Orthopedic Surgery(General/Foot & Ankle), [facility])  Art Prophet to ED( notes reviewed): x-ray negative for fracture, continues to have pain and swelling  Refer to foot specialist  2. Acne, unspecified acne type  -     External Referral To Dermatology  Refer to derm for further evaluation  3. Hydradenitis  -     doxycycline hyclate (VIBRA-TABS) 100 MG tablet; Take 1 tablet by mouth 2 times daily for 7 days, Disp-14 tablet, R-0Normal         Follow up and disposition:   Return for Return in about 4 months (around 6/27/2023) for DM, HLD, HTN, 15min, office, w/labsprior. .      Subjective:    Labs obtained prior to visit? No  Reviewed with patient? N/A    Left foot swelling  Sprained his ankle aroun 3/6/23  Went to ED, x-ray negative for fracture  Given voltaren PO and topical for pain relief  States continues to have swelling and pain on dorsal side of foot  Has not been keeping his foot wrapped    ROS:     Review of Systems  As stated in HPI, otherwise all others negative. The problem list was updated as a part of today's visit. Patient Active Problem List   Diagnosis    Pulmonary nodules    Erectile dysfunction    Hydradenitis    Mixed hyperlipidemia    Controlled type 2 diabetes mellitus without complication, without long-term current use of insulin (HCC)       The PSH, FH were reviewed. SH:  Social History     Tobacco Use    Smoking status: Light Smoker    Smokeless tobacco: Never    Tobacco comments:     Quit smoking: black mild   Substance Use Topics    Alcohol use:  Yes     Alcohol/week: 1.0 standard drink    Drug use: Yes     Types: Marijuana Lorriane Lazcano)       Medications/Allergies:  Current Outpatient Medications on

## 2023-05-02 ENCOUNTER — OFFICE VISIT (OUTPATIENT)
Age: 48
End: 2023-05-02

## 2023-05-02 VITALS — HEIGHT: 78 IN | WEIGHT: 274 LBS | BODY MASS INDEX: 31.7 KG/M2

## 2023-05-02 DIAGNOSIS — S93.402A SPRAIN OF LEFT ANKLE, UNSPECIFIED LIGAMENT, INITIAL ENCOUNTER: ICD-10-CM

## 2023-05-02 DIAGNOSIS — M25.572 ACUTE LEFT ANKLE PAIN: Primary | ICD-10-CM

## 2023-05-02 RX ORDER — MELOXICAM 15 MG/1
15 TABLET ORAL DAILY PRN
Qty: 30 TABLET | Refills: 0 | Status: SHIPPED | OUTPATIENT
Start: 2023-05-02

## 2023-05-02 RX ORDER — FAMOTIDINE 20 MG/1
20 TABLET, FILM COATED ORAL DAILY
Qty: 30 TABLET | Refills: 0 | Status: SHIPPED | OUTPATIENT
Start: 2023-05-02

## 2023-05-02 NOTE — PROGRESS NOTES
DIAGNOSTIC IMAGING      [facility] IMAGING : INTERPRETATION BY RADIOLOGIST       [address]  Outside Information     XR - ANKLE COMPLETE LT    Anatomical Region Laterality Modality   Ankle / Foot -- Computed Radiography   Ankle / Foot -- --     Impression      No acute fracture of left ankle     Signed By: Katiuska Barry MD on 3/6/2023 11:31 PM  Narrative    EXAM: ANKLE COMPLETE LT     CLINICAL INDICATION/HISTORY: TRAUMA   Additional: Pain after slipping on a step     COMPARISON: None     TECHNIQUE: AP lateral and oblique views of the left ankle     FINDINGS:     BONES: Intact. Small chronic spur at the outer margin of medial malleolus. SOFT TISSUES: Mild subcutaneous soft tissue edema is suggested. Procedure Note    Marisela Pearson MD - 03/06/2023   Formatting of this note might be different from the original.   EXAM: ANKLE COMPLETE LT     CLINICAL INDICATION/HISTORY: TRAUMA   Additional: Pain after slipping on a step     COMPARISON: None     TECHNIQUE: AP lateral and oblique views of the left ankle     FINDINGS:     BONES: Intact. Small chronic spur at the outer margin of medial malleolus. SOFT TISSUES: Mild subcutaneous soft tissue edema is suggested. IMPRESSION     No acute fracture of left ankle     Signed By: Katiuska Barry MD on 3/6/2023 11:31 PM  Exam End: 03/06/23 22:25    Specimen Collected: 03/06/23 23:29 Last Resulted: 03/06/23 23:31   Received From: [address]  Result Received: 03/17/23 10:42    View Encounter       I have reviewed the radiology transcribed results and I have reviewed the images of the above study. MITCHEL Sal MD  5/2/2023 9:36 AM
of admission include:    Current Outpatient Medications   Medication Sig    meloxicam (MOBIC) 15 MG tablet Take 1 tablet by mouth daily as needed for Pain Please take with food and Pepcid (famotidine). famotidine (PEPCID) 20 MG tablet Take 1 tablet by mouth daily    diclofenac sodium (VOLTAREN) 1 % GEL Apply 4 g topically 4 times daily    fluticasone (FLONASE) 50 MCG/ACT nasal spray 1 spray by Each Nostril route daily    sildenafil (VIAGRA) 100 MG tablet TAKE ONE TABLET BY MOUTH DAILY AS NEEDED FOR ERECTILE DYSFUNCTION --MAX OF 1 TABLET PER DAY    Lancets MISC Check glucose daily in morning before eating    atorvastatin (LIPITOR) 20 MG tablet take 1 tablet by mouth once daily    cetirizine (ZYRTEC) 10 MG tablet Take 1 tablet by mouth daily    cyclobenzaprine (FLEXERIL) 5 MG tablet Take 1 tablet by mouth 3 times daily as needed    metFORMIN (GLUCOPHAGE) 1000 MG tablet Take 1 tablet by mouth 2 times daily (with meals)     No current facility-administered medications for this visit. DIAGNOSTIC LAB DATA      XR CHEST STANDARD TWO VW 10/09/2020    Narrative  EXAMINATION: Chest PA and Lateral    INDICATION: Pulmonary nodule, 10 mm right lower lobe nodule noted on 10/12/2018  CT abdomen and pelvis. COMPARISONS: Outside hospital report CT abdomen and pelvis 10/12/2018. FINDINGS:    Lungs: Lungs are clear. Pulmonary vasculature is normal. No pleural effusions. No pneumothorax. No corresponding pulmonary nodule seen to correlate with the  described pulmonary nodule on the prior CT abdomen and pelvis 10/12/2018. Heart and Mediastinum: Cardiomediastinal silhouette is normal.    Bones and Soft Tissues: No acute abnormalities. Impression  IMPRESSION:    No acute cardiopulmonary abnormality. Please note CT chest should be used for  follow-up of pulmonary nodules as per Fleischner Society criteria.     As attending radiologist I have assessed the study images, and dictated or  reviewed/edited the final report

## 2023-05-02 NOTE — PATIENT INSTRUCTIONS
It was nice to see you today! Prescription for meloxicam 15 mg was sent to your pharmacy to take once daily as needed for pain. Please take this with food in addition to Pepcid (famotidine) which was also sent to your pharmacy to protect your stomach. You were provided ankle sprain rehab exercises. I recommend upper body strengthening exercises at the gym. Please refrain from exercises that will place increased stress to your ankle. You may consider elliptical or walking on the treadmill. Theraband was also provided to you today for exercise for stretching and strengthening. See you in 6 weeks. Thanks for trusting us with your care.

## 2023-05-27 DIAGNOSIS — S93.402A SPRAIN OF LEFT ANKLE, UNSPECIFIED LIGAMENT, INITIAL ENCOUNTER: ICD-10-CM

## 2023-05-27 DIAGNOSIS — M25.572 ACUTE LEFT ANKLE PAIN: ICD-10-CM

## 2023-05-30 RX ORDER — FAMOTIDINE 20 MG/1
TABLET, FILM COATED ORAL
Qty: 30 TABLET | Refills: 0 | Status: SHIPPED | OUTPATIENT
Start: 2023-05-30 | End: 2023-06-01

## 2023-06-12 ENCOUNTER — TELEPHONE (OUTPATIENT)
Facility: CLINIC | Age: 48
End: 2023-06-12

## 2023-06-12 NOTE — TELEPHONE ENCOUNTER
Patient called an stated he needs another referral for his foot. He stated the ortho doctor he was sent to does not take his insurance. The only referral I see for ortho is from April. He stated he went to dermatology but the would not look at his foot. I saw a note from Dr. Maria L Chow for his foot so I am not sure what he is referring to. Call back [phone].

## 2023-06-13 NOTE — TELEPHONE ENCOUNTER
1st attempt calling patient in reguards of message stating patient needs a new referral  for podiatry. Patient did not answer. Left message stating to call the office back .

## 2023-06-13 NOTE — TELEPHONE ENCOUNTER
Spoke to patient in reguards of message stating patient requesting a new referral for Orthopedic. Patient has been [facility] [address] . Patient has been advised to call the [facility] [address] and explain to the doctor what is going on . Patient verbalized understanding.

## 2023-06-27 RX ORDER — DOXYCYCLINE HYCLATE 100 MG
100 TABLET ORAL 2 TIMES DAILY
Qty: 14 TABLET | Refills: 0 | Status: SHIPPED | OUTPATIENT
Start: 2023-06-27 | End: 2023-07-04

## 2023-06-27 RX ORDER — ATORVASTATIN CALCIUM 20 MG/1
20 TABLET, FILM COATED ORAL DAILY
Qty: 90 TABLET | Refills: 0 | Status: SHIPPED | OUTPATIENT
Start: 2023-06-27

## 2023-06-30 DIAGNOSIS — S93.402A SPRAIN OF LEFT ANKLE, UNSPECIFIED LIGAMENT, INITIAL ENCOUNTER: ICD-10-CM

## 2023-06-30 DIAGNOSIS — M25.572 ACUTE LEFT ANKLE PAIN: ICD-10-CM

## 2023-06-30 RX ORDER — FAMOTIDINE 20 MG/1
TABLET, FILM COATED ORAL
Qty: 30 TABLET | Refills: 0 | Status: SHIPPED | OUTPATIENT
Start: 2023-06-30

## 2023-07-29 DIAGNOSIS — M25.572 ACUTE LEFT ANKLE PAIN: ICD-10-CM

## 2023-07-29 DIAGNOSIS — S93.402A SPRAIN OF LEFT ANKLE, UNSPECIFIED LIGAMENT, INITIAL ENCOUNTER: ICD-10-CM

## 2023-07-31 RX ORDER — FAMOTIDINE 20 MG/1
TABLET, FILM COATED ORAL
Qty: 30 TABLET | Refills: 0 | Status: SHIPPED | OUTPATIENT
Start: 2023-07-31

## 2023-08-07 ENCOUNTER — TELEPHONE (OUTPATIENT)
Facility: CLINIC | Age: 48
End: 2023-08-07

## 2023-08-07 NOTE — TELEPHONE ENCOUNTER
Patient stated that his left foot is still swollen and he is requesting more cream.    Patient is also, requesting to see a foot doctor.

## 2023-08-21 NOTE — TELEPHONE ENCOUNTER
Spoke to patient in reguards of message stating patient would like to see a foot doctor . Patient has been informed that he is seeing a foot doctor and to call his foot doctor with concerns of his feet . Patient verbalized understanding.

## 2023-08-24 ENCOUNTER — TELEPHONE (OUTPATIENT)
Age: 48
End: 2023-08-24

## 2023-08-24 NOTE — TELEPHONE ENCOUNTER
Patient called for . Patient said that he is still having some swelling on his Left foot ankle. Patient is asking if he could get a refill on the Cream that  prescribed for his foot back in May. Patient did not remember the name of the medication. [facility] on [address] in [place]. [phone]. Patient tel. [phone]. Note : patient last seen on 5/2/23 for the left ankle. Patient aware David Raman is out of the office.

## 2023-08-25 ENCOUNTER — OFFICE VISIT (OUTPATIENT)
Facility: CLINIC | Age: 48
End: 2023-08-25
Payer: MEDICAID

## 2023-08-25 VITALS
OXYGEN SATURATION: 96 % | RESPIRATION RATE: 18 BRPM | HEIGHT: 78 IN | BODY MASS INDEX: 31.47 KG/M2 | SYSTOLIC BLOOD PRESSURE: 112 MMHG | HEART RATE: 93 BPM | TEMPERATURE: 98.4 F | WEIGHT: 272 LBS | DIASTOLIC BLOOD PRESSURE: 87 MMHG

## 2023-08-25 DIAGNOSIS — E11.9 CONTROLLED TYPE 2 DIABETES MELLITUS WITHOUT COMPLICATION, WITHOUT LONG-TERM CURRENT USE OF INSULIN (HCC): Primary | ICD-10-CM

## 2023-08-25 DIAGNOSIS — J30.9 NASAL SINUS INFLAMMATION DUE TO ALLERGY: ICD-10-CM

## 2023-08-25 DIAGNOSIS — E78.2 MIXED HYPERLIPIDEMIA: ICD-10-CM

## 2023-08-25 PROCEDURE — 3044F HG A1C LEVEL LT 7.0%: CPT | Performed by: NURSE PRACTITIONER

## 2023-08-25 PROCEDURE — 99214 OFFICE O/P EST MOD 30 MIN: CPT | Performed by: NURSE PRACTITIONER

## 2023-08-25 RX ORDER — AZELASTINE 1 MG/ML
1 SPRAY, METERED NASAL 2 TIMES DAILY
Qty: 30 ML | Refills: 5 | Status: SHIPPED | OUTPATIENT
Start: 2023-08-25

## 2023-08-25 RX ORDER — ATORVASTATIN CALCIUM 20 MG/1
20 TABLET, FILM COATED ORAL DAILY
Qty: 90 TABLET | Refills: 1 | Status: SHIPPED | OUTPATIENT
Start: 2023-08-25

## 2023-08-25 RX ORDER — CETIRIZINE HYDROCHLORIDE 10 MG/1
10 TABLET ORAL DAILY
Qty: 90 TABLET | Refills: 1 | Status: SHIPPED | OUTPATIENT
Start: 2023-08-25

## 2023-08-25 RX ORDER — FLUTICASONE PROPIONATE 50 MCG
2 SPRAY, SUSPENSION (ML) NASAL DAILY
Qty: 16 G | Refills: 5 | Status: SHIPPED | OUTPATIENT
Start: 2023-08-25

## 2023-08-25 ASSESSMENT — PATIENT HEALTH QUESTIONNAIRE - PHQ9
SUM OF ALL RESPONSES TO PHQ QUESTIONS 1-9: 0
SUM OF ALL RESPONSES TO PHQ QUESTIONS 1-9: 0
1. LITTLE INTEREST OR PLEASURE IN DOING THINGS: 0
2. FEELING DOWN, DEPRESSED OR HOPELESS: 0
SUM OF ALL RESPONSES TO PHQ QUESTIONS 1-9: 0
SUM OF ALL RESPONSES TO PHQ QUESTIONS 1-9: 0
SUM OF ALL RESPONSES TO PHQ9 QUESTIONS 1 & 2: 0

## 2023-08-25 NOTE — PROGRESS NOTES
Physical Therapy Evaluation      Visit Count: 1    Insurance Information:   Department Message 08/01/2017 10:30 AM Tierney Rahman - -   Note    MEDICARE CAP CHECK:     PT/SLP  $ 807.73  OT $0     KX will have to be added after the next few visits.                Next Referring Provider Visit: prn or October 2, 2017    Referred by: Tacos Torres MD  Medical Diagnosis (from order):  Osteoarthritis knees  Treatment Diagnosis: Knee Symptoms with Pain, Impaired Joint Mobility, Impaired Range of Motion, Impaired Motor Function/Muscle Performance, Impaired Mobility and Impaired Balance  Insurance: 1. MEDICARE  2. WPS     Diagnosis Precautions: none  Chart reviewed: Relevant co-morbidities, allergies, tests and medications     SUBJECTIVE   Has total knee replacement surgery pending in October and is here to get her legs, arms and mobility function in as good as condition as possible to decrease the risk of post op complications and need for expensive care.   Pain:  Intensity: Now: 8/10; Best: 8/10; Worst: 10/10 (in the last 2 weeks)  Location: (B)  Bilateral knees  Quality/Description: Sharp, Shooting, Sore, Stiff  Relieving/Alleviating factors: ice, prescribed medication  Pain Frequency: Constant  Function:  Limitations and exacerbating factors (patient reported): pain, difficulty, increased time to complete with all activities/tasks with involved extremity  Prior level (patient reported): independent with all activities of daily living and instrumental activites of daily living    Prior Treatment: no therapies in the past year for current condition. Hospitalization, home health services or skilled nursing facility in the last 30 days: No, per patient.    Home Environment/Social Support:   Patient has intermittent assist from family/friends.      Safety:  Do you feel safe at home, work and/or school? yes, per patient  Falls: balance history in last year (< or equal to 2 falls/near falls and/or reasons) does not  Room 9    Glorianne Barthel had concerns including Follow-up Chronic Condition, Diabetes, Cholesterol Problem, and Hypertension. for today's visit . When asked if patient has any concerns he would like to address with ANA Pierson . Patient states I need my allergy pills refills . ANA Pierson has been notified  of patient concerns . Did patient bring someone? No     Did the patient have DME equipment? No     Did you take your medication today? Yes       1. \"Have you been to the ER, urgent care clinic since your last visit? Hospitalized since your last visit? \" . NO    2. \"Have you seen or consulted any other health care providers outside of the [address] [place] [address] since your last visit? \" No     3. For patients aged 43-73: Has the patient had a colonoscopy / FIT/ Cologuard? Patient states I have an appointment on 9/11/23. If the patient is female:    4. For patients aged 43-66: Has the patient had a mammogram within the past 2 years? N/A      5. For patients aged 21-65: Has the patient had a pap smear? {Cancer Care Gap present?  N/A    PHQ-9  8/25/2023   Little interest or pleasure in doing things 0   Little interest or pleasure in doing things -   Feeling down, depressed, or hopeless 0   PHQ-2 Score 0   Total Score PHQ 2 -   PHQ-9 Total Score 0          Health Maintenance Due   Topic Date Due    Pneumococcal 0-64 years Vaccine (1 - PCV) Never done    Hepatitis B vaccine (1 of 3 - Risk 3-dose series) Never done    DTaP/Tdap/Td vaccine (1 - Tdap) Never done    COVID-19 Vaccine (4 - Booster for Pfizer series) 05/05/2022    Colorectal Cancer Screen  Never done    Flu vaccine (1) 08/01/2023 indicate further testing      Patient Goals/Concerns:  To get stronger, safer and more mobile prior to surgery    OBJECTIVE   Posture/Observation:  Overweight, valgus at knees, moves slowly, but safely without device    Gait Analysis:  Labored with obvious pain, limited knees flexion with toe off biltaerally    Muscle Flexibility: poor to fair at quads, hamstrings, gluteals and gastrocs    Requires stand by assist and moderate modification to ascend and descend 3 stairs.    Fatigues quickly and becomes SOB with mild activity.    Dynamic balance = fair    Leg strength = 3+/5    Outcome Measures: Lower Extremity Functional Scale: LEFS Calculated Total: 22   (0=extreme difficulty; 80=no difficulty)      6 MWT = 1,133 feet no device, moderate effort , moderate increase in knee pain    Initial Treatment   Initial evaluation completed.    Therapeutic Exercise:   Therapeutic exercise to increase strength, joint function, AROM,cardiopulmonary function and ADL function; to decrease pain, risk and amount of fatigue, skeletal muscle loss, functional loss; to assist in maintining or improving proper body mass index.    Initial Home Program:  * above denotes home program issuance as well  Walk, single leg stand, wall push ups, mini squats    Plan for next session: pregress balance, self care strength program    ASSESSMENT   60 year old female presents to therapy with significant decline in prior level of function due to signs and symptoms consistent with severe OA bilateral knees. She is expected to gain much from rehab propr to her surgery.    Outcome:    Benefit from skilled therapy: yes   Rehab potential is good due to positive factors motivation level and negative factors co-morbidities    Predicted patient presentation: stable and/or uncomplicated characteristics   Plan of care to increase strength/stability, decrease pain, improve activity tolerance, improve quality of gait to address functional limitations listed  above.    Goals:  To be obtained by end of this plan of care:  1. Patient independent with modified and progressed home exercise program.  2. Gait safety  3. Increase functional tolerance  PLAN   Frequency/Duration: 1x/ 1-2 weeks  Skilled training and instruction for the following interventions:  Gait Training (40199)  Therapeutic Exercise (44051)    The plan of care and goals were established with the patient who concurs.  Patient has been given attendance policy at time of initial evaluation.    Patient Education:  Who will be receiving education: patient  Are they ready to learn: yes  Preferred learning style: written, verbal, demonstration  Barriers to learning: no barriers apparent at this time   Result of initial outlined education: Needs reinforcement    THERAPY DAILY BILLING   Primary Insurance: MEDICARE  Secondary Insurance: S    Evaluation Procedures:  Physical Therapy Evaluation: Low Complexity    Timed Procedures:  Therapeutic Exercise, 15 minutes    Untimed Procedures:  No untimed codes were used on this date of service    Total Treatment Time: 45 minutes    G-Code:  G-Code Score ABN form  : Current Mobility Limitation,  CL - 60% to 79% impaired, limited or restricted  : Goal Mobility Limitation,  CK - 40% to 59% impaired, limited or restricted  Modifier based on outcome measure(s)/functional testing/clinical judgement as listed above    The referring provider's electronic or written signature on the evaluation authorizes the therapy plan of care and certifies the need for these services, furnished under this plan of care while under their care.  Electronically sent for physician signature

## 2023-08-25 NOTE — PROGRESS NOTES
[address] [facility], [address] [facility]               [phone]      Denise Kay is a 41 y.o. male and presents with Follow-up Chronic Condition, Diabetes, Cholesterol Problem, and Hypertension       Assessment/Plan:    1. Controlled type 2 diabetes mellitus without complication, without long-term current use of insulin (HCC)  -     Comprehensive Metabolic Panel; Future  -     Hemoglobin A1C; Future  Endorses medication compliance, Follow up labs prior to next visit, Denies signs and symptoms of hyper or hypoglycemia, and Diabetes controlled, A1C <7  2. Mixed hyperlipidemia  -     atorvastatin (LIPITOR) 20 MG tablet; Take 1 tablet by mouth daily, Disp-90 tablet, R-1Normal  -     Lipid Panel; Future  Endorses medication compliance, Refill provided, Follow up labs prior to next visit, and Denies abdominal pain or symptoms of myalgia  3. Nasal sinus inflammation due to allergy  -     cetirizine (ZYRTEC) 10 MG tablet; Take 1 tablet by mouth daily, Disp-90 tablet, R-1Normal  -     fluticasone (FLONASE) 50 MCG/ACT nasal spray; 2 sprays by Each Nostril route daily, Disp-16 g, R-5Normal  -     azelastine (ASTELIN) 0.1 % nasal spray; 1 spray by Nasal route 2 times daily Use in each nostril as directed, Disp-30 mL, R-5Normal    Endorses a chronic sinusits, has been using afrin daily, advised daily afrin can make the problem worsen  Rx given for symptomatic relief, strongly advised to stop afrin      Follow up and disposition:   Return in about 4 months (around 12/25/2023) for CPE, DM, HLD, 30min, w/labsprior. Subjective:    Labs obtained prior to visit? No  Reviewed with patient?  N/A      Allergies  Long standing has worsened  Has been using afrin two times a day  Has been using daily for months  Taking zyrtec as needed  Sleeping under [facility]  Stuffy runny nose  Drainage is clear  Denies ear symptoms, eye symptoms      DMII-   Patient reports medication compliance Yes  Diabetic diet compliance

## 2023-08-26 NOTE — PROGRESS NOTES
Bell Lee   [address]  [address]  [facility] [address]    Medications below, sent to the pharmacy      Orders Placed This Encounter    diclofenac sodium (VOLTAREN) 1 % GEL     Sig: Apply 4 g topically 4 times daily     Dispense:  150 g     Refill:  1           Lico Trimble MD  8/26/2023  7:33 PM

## 2023-08-29 DIAGNOSIS — M25.572 ACUTE LEFT ANKLE PAIN: ICD-10-CM

## 2023-08-29 DIAGNOSIS — S93.402A SPRAIN OF LEFT ANKLE, UNSPECIFIED LIGAMENT, INITIAL ENCOUNTER: ICD-10-CM

## 2023-08-29 RX ORDER — FAMOTIDINE 20 MG/1
TABLET, FILM COATED ORAL
Qty: 30 TABLET | Refills: 0 | Status: SHIPPED | OUTPATIENT
Start: 2023-08-29

## 2023-09-28 ENCOUNTER — TELEPHONE (OUTPATIENT)
Facility: CLINIC | Age: 48
End: 2023-09-28

## 2023-09-28 DIAGNOSIS — S93.402A SPRAIN OF LEFT ANKLE, UNSPECIFIED LIGAMENT, INITIAL ENCOUNTER: ICD-10-CM

## 2023-09-28 DIAGNOSIS — M25.572 ACUTE LEFT ANKLE PAIN: ICD-10-CM

## 2023-09-28 RX ORDER — FAMOTIDINE 20 MG/1
TABLET, FILM COATED ORAL
Qty: 30 TABLET | Refills: 0 | Status: SHIPPED | OUTPATIENT
Start: 2023-09-28

## 2023-09-28 NOTE — TELEPHONE ENCOUNTER
Patient is having problems reaching the Podiatry, and request another recommendation. His feet is still swollen.

## 2023-10-02 ENCOUNTER — TELEPHONE (OUTPATIENT)
Facility: CLINIC | Age: 48
End: 2023-10-02

## 2023-10-02 DIAGNOSIS — S93.602S FOOT SPRAIN, LEFT, SEQUELA: Primary | ICD-10-CM

## 2023-10-02 NOTE — TELEPHONE ENCOUNTER
Patient is following up on his request for a referral to another foot doctor. He stated he needs one as soon as possible.

## 2023-10-03 NOTE — TELEPHONE ENCOUNTER
Called patient In reguards of message stating patient referral has been placed for another foot doctor. Patient did not answer. Left message stating to call the office back for appointment .

## 2023-10-28 DIAGNOSIS — S93.402A SPRAIN OF LEFT ANKLE, UNSPECIFIED LIGAMENT, INITIAL ENCOUNTER: ICD-10-CM

## 2023-10-28 DIAGNOSIS — M25.572 ACUTE LEFT ANKLE PAIN: ICD-10-CM

## 2023-10-31 RX ORDER — FAMOTIDINE 20 MG/1
TABLET, FILM COATED ORAL
Qty: 30 TABLET | Refills: 0 | OUTPATIENT
Start: 2023-10-31

## 2023-12-28 ENCOUNTER — TELEPHONE (OUTPATIENT)
Facility: CLINIC | Age: 48
End: 2023-12-28

## 2023-12-28 NOTE — TELEPHONE ENCOUNTER
----- Message from Ascencionanayeligalindo Rdz sent at 12/26/2023  8:47 AM EST -----  Subject: Message to Provider    QUESTIONS  Information for Provider? Patient would like to speak with the office   wants to know if it could be done today . Patient would like to know if he   can be put on ozempic for weight loss and it be sent to [facility] please   advise.  ---------------------------------------------------------------------------  --------------  CALL BACK INFO  [phone]; OK to leave message on voicemail  ---------------------------------------------------------------------------  --------------  SCRIPT ANSWERS  Relationship to Patient? Self

## 2024-01-03 ENCOUNTER — TELEPHONE (OUTPATIENT)
Facility: CLINIC | Age: 49
End: 2024-01-03

## 2024-01-03 NOTE — TELEPHONE ENCOUNTER
----- Message from Ismael Wright sent at 1/3/2024  9:19 AM EST -----  Subject: Message to Provider    QUESTIONS  Information for Provider? pt had to cancel his appt for 1/4/2024 and says   that if the doctors to speak with him she can call him.   ---------------------------------------------------------------------------  --------------  CALL BACK INFO  [phone]; OK to leave message on voicemail  ---------------------------------------------------------------------------  --------------  SCRIPT ANSWERS  Relationship to Patient? Self

## 2024-01-26 ENCOUNTER — OFFICE VISIT (OUTPATIENT)
Facility: CLINIC | Age: 49
End: 2024-01-26

## 2024-01-26 VITALS
OXYGEN SATURATION: 93 % | RESPIRATION RATE: 18 BRPM | BODY MASS INDEX: 30.43 KG/M2 | HEIGHT: 78 IN | SYSTOLIC BLOOD PRESSURE: 119 MMHG | WEIGHT: 263 LBS | TEMPERATURE: 98 F | HEART RATE: 107 BPM | DIASTOLIC BLOOD PRESSURE: 77 MMHG

## 2024-01-26 DIAGNOSIS — L73.2 HYDRADENITIS: ICD-10-CM

## 2024-01-26 DIAGNOSIS — G89.29 CHRONIC BILATERAL LOW BACK PAIN WITHOUT SCIATICA: ICD-10-CM

## 2024-01-26 DIAGNOSIS — M54.50 CHRONIC BILATERAL LOW BACK PAIN WITHOUT SCIATICA: ICD-10-CM

## 2024-01-26 DIAGNOSIS — E78.2 MIXED HYPERLIPIDEMIA: ICD-10-CM

## 2024-01-26 DIAGNOSIS — E11.9 CONTROLLED TYPE 2 DIABETES MELLITUS WITHOUT COMPLICATION, WITHOUT LONG-TERM CURRENT USE OF INSULIN (HCC): Primary | ICD-10-CM

## 2024-01-26 DIAGNOSIS — J30.9 NASAL SINUS INFLAMMATION DUE TO ALLERGY: ICD-10-CM

## 2024-01-26 DIAGNOSIS — L03.317 CELLULITIS OF BUTTOCK: ICD-10-CM

## 2024-01-26 LAB — HBA1C MFR BLD: 6.2 %

## 2024-01-26 RX ORDER — MELOXICAM 15 MG/1
15 TABLET ORAL DAILY
Qty: 90 TABLET | Refills: 1 | Status: SHIPPED | OUTPATIENT
Start: 2024-01-26

## 2024-01-26 RX ORDER — MELOXICAM 15 MG/1
15 TABLET ORAL DAILY
COMMUNITY
Start: 2023-11-13 | End: 2024-01-26 | Stop reason: SDUPTHER

## 2024-01-26 RX ORDER — CETIRIZINE HYDROCHLORIDE 10 MG/1
10 TABLET ORAL DAILY
Qty: 90 TABLET | Refills: 1 | Status: SHIPPED | OUTPATIENT
Start: 2024-01-26

## 2024-01-26 RX ORDER — AZELASTINE 1 MG/ML
1 SPRAY, METERED NASAL 2 TIMES DAILY
Qty: 30 ML | Refills: 5 | Status: SHIPPED | OUTPATIENT
Start: 2024-01-26

## 2024-01-26 RX ORDER — ATORVASTATIN CALCIUM 20 MG/1
20 TABLET, FILM COATED ORAL DAILY
Qty: 90 TABLET | Refills: 1 | Status: SHIPPED | OUTPATIENT
Start: 2024-01-26

## 2024-01-26 RX ORDER — DOXYCYCLINE HYCLATE 100 MG
100 TABLET ORAL 2 TIMES DAILY
Qty: 14 TABLET | Refills: 0 | Status: SHIPPED | OUTPATIENT
Start: 2024-01-26 | End: 2024-02-02

## 2024-01-26 ASSESSMENT — PATIENT HEALTH QUESTIONNAIRE - PHQ9
1. LITTLE INTEREST OR PLEASURE IN DOING THINGS: 0
SUM OF ALL RESPONSES TO PHQ9 QUESTIONS 1 & 2: 0
SUM OF ALL RESPONSES TO PHQ QUESTIONS 1-9: 0
2. FEELING DOWN, DEPRESSED OR HOPELESS: 0
SUM OF ALL RESPONSES TO PHQ QUESTIONS 1-9: 0

## 2024-01-26 NOTE — PROGRESS NOTES
Nick Moyer is a 48 y.o. male (: 1975) presenting to address:    Chief Complaint   Patient presents with    Follow-up       Vitals:    24 1257   BP: 119/77   Pulse: (!) 107   Resp: 18   Temp: 98 °F (36.7 °C)   SpO2: 93%       Coordination of Care Questionaire:   1. \"Have you been to the ER, urgent care clinic since your last visit?  Hospitalized since your last visit?\" no    2. \"Have you seen or consulted any other health care providers outside of the [facility] since your last visit?\" Yes     3. For patients aged 45-75: Has the patient had a colonoscopy / FIT/ Cologuard? Yes      If the patient is female:    4. For patients aged 40-74: Has the patient had a mammogram within the past 2 years? N/A    5. For patients aged 21-65: Has the patient had a pap smear? N/a    Advanced Directive:   1. Do you have an Advanced Directive? No    2. Would you like information on Advanced Directives? N/a  
Topics    Alcohol use: Yes     Alcohol/week: 1.0 standard drink of alcohol    Drug use: Yes     Types: Marijuana (Weed)       Medications/Allergies:  Current Outpatient Medications on File Prior to Visit   Medication Sig Dispense Refill    meloxicam (MOBIC) 15 MG tablet Take 1 tablet by mouth daily      sildenafil (VIAGRA) 100 MG tablet TAKE 1 TABLET BY MOUTH DAILY AS NEEDED FOR ERECTILE DYSFUNCTION 30 tablet 0    famotidine (PEPCID) 20 MG tablet take 1 tablet by mouth once daily 30 tablet 0    cetirizine (ZYRTEC) 10 MG tablet Take 1 tablet by mouth daily 90 tablet 1    fluticasone (FLONASE) 50 MCG/ACT nasal spray 2 sprays by Each Nostril route daily 16 g 5    azelastine (ASTELIN) 0.1 % nasal spray 1 spray by Nasal route 2 times daily Use in each nostril as directed 30 mL 5    atorvastatin (LIPITOR) 20 MG tablet Take 1 tablet by mouth daily 90 tablet 1    tadalafil (CIALIS) 5 MG tablet Take 1 tablet by mouth daily as needed for Erectile Dysfunction 90 tablet 3    metFORMIN (GLUCOPHAGE) 1000 MG tablet Take 1 tablet by mouth 2 times daily (with meals)       No current facility-administered medications on file prior to visit.        Allergies   Allergen Reactions    Chlorpheniramine-Phenylephrine      Denies allergy     Pollen Extract      Other reaction(s): Sneezing       Objective:  /77 (Site: Right Upper Arm, Position: Sitting, Cuff Size: Large Adult)   Pulse (!) 107   Temp 98 °F (36.7 °C) (Temporal)   Resp 18   Ht 2.057 m (6' 9\")   Wt 119.3 kg (263 lb)   SpO2 93%   BMI 28.18 kg/m²  Body mass index is 28.18 kg/m².    Physical assessment  Physical Exam            I have discussed the diagnosis with the patient and the intended plan as seen in the above orders.  The patient has received an After-Visit Summary and questions were answered concerning future plans.     An After Visit Summary was printed and given to the patient.    All diagnosis have been discussed with the patient and all of the patient's

## 2024-02-13 ENCOUNTER — TELEPHONE (OUTPATIENT)
Facility: CLINIC | Age: 49
End: 2024-02-13

## 2024-02-13 DIAGNOSIS — E11.9 CONTROLLED TYPE 2 DIABETES MELLITUS WITHOUT COMPLICATION, WITHOUT LONG-TERM CURRENT USE OF INSULIN (HCC): Primary | ICD-10-CM

## 2024-02-14 DIAGNOSIS — E11.9 CONTROLLED TYPE 2 DIABETES MELLITUS WITHOUT COMPLICATION, WITHOUT LONG-TERM CURRENT USE OF INSULIN (HCC): ICD-10-CM

## 2024-02-14 NOTE — TELEPHONE ENCOUNTER
Patient advised that his medication was sent to the wrong pharmacy and he is completely out of medication (Metformin)  I have requested the medication and changed the pharmacy.  It is attached to this communication.

## 2024-03-26 ENCOUNTER — TELEPHONE (OUTPATIENT)
Facility: CLINIC | Age: 49
End: 2024-03-26

## 2024-03-26 NOTE — TELEPHONE ENCOUNTER
Pt would like to know if he is due for a flu shot because he says he hasn't had one in a while and wants to know if he needs another corona virus booster shot as well.

## 2024-03-29 ENCOUNTER — TELEPHONE (OUTPATIENT)
Facility: CLINIC | Age: 49
End: 2024-03-29

## 2024-03-29 NOTE — TELEPHONE ENCOUNTER
While advising patient he is due for his flu shot 23-24. Patient requested for his Metformin to be changed to Ozempic. Patient stated, \"that he needs to get this weight off\".    Patient was scheduled a nurse visit for his flu shot.

## 2024-04-10 ENCOUNTER — NURSE ONLY (OUTPATIENT)
Facility: CLINIC | Age: 49
End: 2024-04-10
Payer: MEDICARE

## 2024-04-10 DIAGNOSIS — Z23 ENCOUNTER FOR IMMUNIZATION: Primary | ICD-10-CM

## 2024-04-10 PROCEDURE — G0008 ADMIN INFLUENZA VIRUS VAC: HCPCS | Performed by: NURSE PRACTITIONER

## 2024-04-10 PROCEDURE — 90674 CCIIV4 VAC NO PRSV 0.5 ML IM: CPT | Performed by: NURSE PRACTITIONER

## 2024-04-10 NOTE — PROGRESS NOTES
Patient was given VIS for review, consent was obtained and per orders of JOSE.Caryn Flores , injection of Flu vaccine  given by [facility]. Patient observed. No signs nor symptoms of any adverse reactions.Patient tolerated injection well.

## 2024-04-11 ENCOUNTER — TELEPHONE (OUTPATIENT)
Facility: CLINIC | Age: 49
End: 2024-04-11

## 2024-04-11 DIAGNOSIS — J30.9 NASAL SINUS INFLAMMATION DUE TO ALLERGY: ICD-10-CM

## 2024-04-11 DIAGNOSIS — E78.2 MIXED HYPERLIPIDEMIA: ICD-10-CM

## 2024-04-11 NOTE — TELEPHONE ENCOUNTER
Patient is requesting a Rx refill on his Atorvastatin 20 mg and Cetirizine 10 mg.  Patient is completely out.

## 2024-04-12 RX ORDER — ATORVASTATIN CALCIUM 20 MG/1
20 TABLET, FILM COATED ORAL DAILY
Qty: 90 TABLET | Refills: 1 | Status: SHIPPED | OUTPATIENT
Start: 2024-04-12

## 2024-04-12 RX ORDER — CETIRIZINE HYDROCHLORIDE 10 MG/1
10 TABLET ORAL DAILY
Qty: 90 TABLET | Refills: 1 | Status: SHIPPED | OUTPATIENT
Start: 2024-04-12

## 2024-04-18 ENCOUNTER — TELEPHONE (OUTPATIENT)
Facility: CLINIC | Age: 49
End: 2024-04-18

## 2024-04-18 DIAGNOSIS — J30.9 NASAL SINUS INFLAMMATION DUE TO ALLERGY: ICD-10-CM

## 2024-04-18 NOTE — TELEPHONE ENCOUNTER
----- Message from Gisel Ambroico sent at 4/17/2024  4:00 PM EDT -----  Subject: Refill Request    QUESTIONS  Name of Medication? azelastine (ASTELIN) 0.1 % nasal spray  Patient-reported dosage and instructions? 1 spray by Nasal route 2 times   daily Use in each nostril as directed  How many days do you have left? 0  Preferred Pharmacy? [id number]  Pharmacy phone number (if available)? [phone]  Additional Information for Provider? Patient is asking if PCP, Caryn Flores will prescribe him two of the Nasal Sprays. Please reach out to the   patient to discuss.   ---------------------------------------------------------------------------  --------------  CALL BACK INFO  What is the best way for the office to contact you? OK to leave message on   voicemail  Preferred Call Back Phone Number? [phone]  ---------------------------------------------------------------------------  --------------  SCRIPT ANSWERS  Relationship to Patient? Self

## 2024-04-19 RX ORDER — AZELASTINE 1 MG/ML
1 SPRAY, METERED NASAL 2 TIMES DAILY
Qty: 30 ML | Refills: 5 | Status: SHIPPED | OUTPATIENT
Start: 2024-04-19

## 2024-06-04 DIAGNOSIS — G89.29 CHRONIC BILATERAL LOW BACK PAIN WITHOUT SCIATICA: ICD-10-CM

## 2024-06-04 DIAGNOSIS — M54.50 CHRONIC BILATERAL LOW BACK PAIN WITHOUT SCIATICA: ICD-10-CM

## 2024-06-04 RX ORDER — MELOXICAM 15 MG/1
15 TABLET ORAL DAILY
Qty: 90 TABLET | Refills: 1 | Status: CANCELLED | OUTPATIENT
Start: 2024-06-04

## 2024-06-06 DIAGNOSIS — J30.9 NASAL SINUS INFLAMMATION DUE TO ALLERGY: ICD-10-CM

## 2024-06-06 NOTE — TELEPHONE ENCOUNTER
Pt called to get refill on medications: fluticasone (FLONASE) 50 MCG/ACT nasal spray, azelastine (ASTELIN) 0.1 % nasal spray , medication for muscles cramps (pt did not know the name for it), and medication for sweat glands.

## 2024-06-09 RX ORDER — AZELASTINE 1 MG/ML
1 SPRAY, METERED NASAL 2 TIMES DAILY
Qty: 30 ML | Refills: 5 | OUTPATIENT
Start: 2024-06-09

## 2024-06-09 RX ORDER — FLUTICASONE PROPIONATE 50 MCG
2 SPRAY, SUSPENSION (ML) NASAL DAILY
Qty: 16 G | Refills: 5 | OUTPATIENT
Start: 2024-06-09

## 2024-06-09 RX ORDER — SILDENAFIL 100 MG/1
100 TABLET, FILM COATED ORAL DAILY PRN
Qty: 30 TABLET | Refills: 0 | OUTPATIENT
Start: 2024-06-09

## 2024-06-10 DIAGNOSIS — M54.50 CHRONIC BILATERAL LOW BACK PAIN WITHOUT SCIATICA: ICD-10-CM

## 2024-06-10 DIAGNOSIS — J30.9 NASAL SINUS INFLAMMATION DUE TO ALLERGY: ICD-10-CM

## 2024-06-10 DIAGNOSIS — G89.29 CHRONIC BILATERAL LOW BACK PAIN WITHOUT SCIATICA: ICD-10-CM

## 2024-06-10 RX ORDER — DOXYCYCLINE HYCLATE 100 MG
100 TABLET ORAL 2 TIMES DAILY
COMMUNITY
End: 2024-06-10 | Stop reason: SDUPTHER

## 2024-06-10 RX ORDER — AZELASTINE 1 MG/ML
1 SPRAY, METERED NASAL 2 TIMES DAILY
Qty: 30 ML | Refills: 5 | Status: SHIPPED | OUTPATIENT
Start: 2024-06-10

## 2024-06-10 RX ORDER — DOXYCYCLINE HYCLATE 100 MG
100 TABLET ORAL 2 TIMES DAILY
Qty: 14 TABLET | Refills: 0 | Status: CANCELLED | OUTPATIENT
Start: 2024-06-10 | End: 2024-06-17

## 2024-06-10 RX ORDER — MELOXICAM 15 MG/1
15 TABLET ORAL DAILY
Qty: 90 TABLET | Refills: 1 | Status: SHIPPED | OUTPATIENT
Start: 2024-06-10

## 2024-06-10 RX ORDER — AZELASTINE 1 MG/ML
1 SPRAY, METERED NASAL 2 TIMES DAILY
Qty: 30 ML | Refills: 5 | Status: CANCELLED | OUTPATIENT
Start: 2024-06-10

## 2024-06-10 RX ORDER — FLUTICASONE PROPIONATE 50 MCG
2 SPRAY, SUSPENSION (ML) NASAL DAILY
Qty: 16 G | Refills: 5 | Status: SHIPPED | OUTPATIENT
Start: 2024-06-10

## 2024-06-10 RX ORDER — DOXYCYCLINE HYCLATE 100 MG
100 TABLET ORAL 2 TIMES DAILY
Qty: 14 TABLET | Refills: 0 | Status: SHIPPED | OUTPATIENT
Start: 2024-06-10 | End: 2024-06-17

## 2024-06-10 NOTE — TELEPHONE ENCOUNTER
Pt called about medication refills and I relayed to pt that he would need to set appt (per Caryn Flores) : Physical, DM, HLD, 30min, office, w/labsprior.   Pt responded that he will be coming in person.

## 2024-06-17 DIAGNOSIS — M54.50 CHRONIC BILATERAL LOW BACK PAIN WITHOUT SCIATICA: ICD-10-CM

## 2024-06-17 DIAGNOSIS — G89.29 CHRONIC BILATERAL LOW BACK PAIN WITHOUT SCIATICA: ICD-10-CM

## 2024-06-17 DIAGNOSIS — J30.9 NASAL SINUS INFLAMMATION DUE TO ALLERGY: ICD-10-CM

## 2024-06-17 RX ORDER — FLUTICASONE PROPIONATE 50 MCG
2 SPRAY, SUSPENSION (ML) NASAL DAILY
Qty: 16 G | Refills: 5 | Status: SHIPPED | OUTPATIENT
Start: 2024-06-17

## 2024-06-17 RX ORDER — MELOXICAM 15 MG/1
15 TABLET ORAL DAILY
Qty: 90 TABLET | Refills: 1 | Status: SHIPPED | OUTPATIENT
Start: 2024-06-17

## 2024-06-17 RX ORDER — DOXYCYCLINE HYCLATE 100 MG
100 TABLET ORAL 2 TIMES DAILY
Qty: 14 TABLET | Refills: 0 | Status: SHIPPED | OUTPATIENT
Start: 2024-06-17 | End: 2024-06-24

## 2024-06-17 RX ORDER — AZELASTINE 1 MG/ML
1 SPRAY, METERED NASAL 2 TIMES DAILY
Qty: 30 ML | Refills: 5 | Status: SHIPPED | OUTPATIENT
Start: 2024-06-17

## 2024-07-24 ENCOUNTER — OFFICE VISIT (OUTPATIENT)
Facility: CLINIC | Age: 49
End: 2024-07-24
Payer: MEDICARE

## 2024-07-24 VITALS
BODY MASS INDEX: 29.62 KG/M2 | DIASTOLIC BLOOD PRESSURE: 82 MMHG | HEIGHT: 78 IN | TEMPERATURE: 98.1 F | OXYGEN SATURATION: 98 % | WEIGHT: 256 LBS | RESPIRATION RATE: 18 BRPM | SYSTOLIC BLOOD PRESSURE: 122 MMHG | HEART RATE: 86 BPM

## 2024-07-24 DIAGNOSIS — E11.9 CONTROLLED TYPE 2 DIABETES MELLITUS WITHOUT COMPLICATION, WITHOUT LONG-TERM CURRENT USE OF INSULIN (HCC): ICD-10-CM

## 2024-07-24 DIAGNOSIS — Z71.89 ADVANCED CARE PLANNING/COUNSELING DISCUSSION: ICD-10-CM

## 2024-07-24 DIAGNOSIS — M54.50 CHRONIC BILATERAL LOW BACK PAIN WITHOUT SCIATICA: ICD-10-CM

## 2024-07-24 DIAGNOSIS — L60.8 TOENAIL DEFORMITY: ICD-10-CM

## 2024-07-24 DIAGNOSIS — Z00.00 MEDICARE ANNUAL WELLNESS VISIT, SUBSEQUENT: Primary | ICD-10-CM

## 2024-07-24 DIAGNOSIS — G89.29 CHRONIC BILATERAL LOW BACK PAIN WITHOUT SCIATICA: ICD-10-CM

## 2024-07-24 DIAGNOSIS — E78.2 MIXED HYPERLIPIDEMIA: ICD-10-CM

## 2024-07-24 PROCEDURE — G0439 PPPS, SUBSEQ VISIT: HCPCS | Performed by: NURSE PRACTITIONER

## 2024-07-24 RX ORDER — MELOXICAM 15 MG/1
15 TABLET ORAL DAILY
Qty: 90 TABLET | Refills: 1 | Status: SHIPPED | OUTPATIENT
Start: 2024-07-24

## 2024-07-24 NOTE — PROGRESS NOTES
Room 8    Nick Moyer had concerns including Medicare AWV. for today's visit .     1. \"Have you been to the ER, urgent care clinic since your last visit?  Hospitalized since your last visit?\" .NO    2. \"Have you seen or consulted any other health care providers outside of the [facility] since your last visit?\" No     3. For patients aged 45-75: Has the patient had a colonoscopy / FIT/ Cologuard? Yes      If the patient is female:    4. For patients aged 40-74: Has the patient had a mammogram within the past 2 years? N/A      5. For patients aged 21-65: Has the patient had a pap smear? {Cancer Care Gap present? N/A          7/24/2024     9:25 AM   PHQ-9    Little interest or pleasure in doing things 0   Feeling down, depressed, or hopeless 0   PHQ-2 Score 0   PHQ-9 Total Score 0          Health Maintenance Due   Topic Date Due    Hepatitis B vaccine (1 of 3 - 3-dose series) Never done    Pneumococcal 0-64 years Vaccine (1 of 2 - PCV) Never done    DTaP/Tdap/Td vaccine (1 - Tdap) Never done    COVID-19 Vaccine (4 - 2023-24 season) 09/01/2023    Diabetic foot exam  11/22/2023    Annual Wellness Visit (Medicare Advantage)  Never done    Diabetic Alb to Cr ratio (uACR) test  02/27/2024    Lipids  02/27/2024    GFR test (Diabetes, CKD 3-4, OR last GFR 15-59)  02/27/2024    Diabetic retinal exam  04/21/2024             
smoking: black mild     Interventions:  Patient declined any further intervention or treatment                      Objective   Vitals:    07/24/24 0931   BP: 122/82   Pulse: 86   Resp: 18   Temp: 98.1 °F (36.7 °C)   TempSrc: Temporal   SpO2: 98%   Weight: 116.1 kg (256 lb)   Height: 2.057 m (6' 9\")      Body mass index is 27.43 kg/m².                    Allergies   Allergen Reactions    Chlorpheniramine-Phenylephrine      Denies allergy     Pollen Extract      Other reaction(s): Sneezing     Prior to Visit Medications    Medication Sig Taking? Authorizing Provider   meloxicam (MOBIC) 15 MG tablet Take 1 tablet by mouth daily Yes Caryn Flores APRN - CNP   azelastine (ASTELIN) 0.1 % nasal spray 1 spray by Nasal route 2 times daily Use in each nostril as directed Yes Caryn Flores APRN - CNP   fluticasone (FLONASE) 50 MCG/ACT nasal spray 2 sprays by Each Nostril route daily Yes Caryn Flores APRN - CNP   atorvastatin (LIPITOR) 20 MG tablet Take 1 tablet by mouth daily Yes Caryn Flores APRN - CNP   cetirizine (ZYRTEC) 10 MG tablet Take 1 tablet by mouth daily Yes Caryn Flores APRN - CNP   sildenafil (VIAGRA) 100 MG tablet Take 1 tablet by mouth as needed for Erectile Dysfunction Yes Rad Sue PA-C   metFORMIN (GLUCOPHAGE) 1000 MG tablet Take 1 tablet by mouth 2 times daily (with meals) Yes Caryn Flores APRN - CNP   sildenafil (VIAGRA) 100 MG tablet TAKE 1 TABLET BY MOUTH DAILY AS NEEDED FOR ERECTILE DYSFUNCTION Yes Rad Sue PA-C   famotidine (PEPCID) 20 MG tablet take 1 tablet by mouth once daily Yes Marcus Acevedo MD   tadalafil (CIALIS) 5 MG tablet Take 1 tablet by mouth daily as needed for Erectile Dysfunction Yes Rad Sue PA-C       CareTeam (Including outside providers/suppliers regularly involved in providing care):   Patient Care Team:  Caryn Flores APRN - CNP as PCP - General (Nurse Practitioner Gerontology)  Caryn Flores APRN
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
## 2024-08-10 DIAGNOSIS — E11.9 CONTROLLED TYPE 2 DIABETES MELLITUS WITHOUT COMPLICATION, WITHOUT LONG-TERM CURRENT USE OF INSULIN (HCC): ICD-10-CM

## 2024-08-20 ENCOUNTER — LAB (OUTPATIENT)
Facility: CLINIC | Age: 49
End: 2024-08-20

## 2024-08-20 DIAGNOSIS — E11.9 CONTROLLED TYPE 2 DIABETES MELLITUS WITHOUT COMPLICATION, WITHOUT LONG-TERM CURRENT USE OF INSULIN (HCC): ICD-10-CM

## 2024-08-20 DIAGNOSIS — E78.2 MIXED HYPERLIPIDEMIA: ICD-10-CM

## 2024-08-21 LAB
A/G RATIO: 1.7 RATIO (ref 1.1–2.6)
ALBUMIN: 4.2 G/DL (ref 3.5–5)
ALP BLD-CCNC: 69 U/L (ref 25–115)
ALT SERPL-CCNC: 32 U/L (ref 5–40)
ANION GAP SERPL CALCULATED.3IONS-SCNC: 9 MMOL/L (ref 3–15)
AST SERPL-CCNC: 26 U/L (ref 10–37)
BILIRUB SERPL-MCNC: 0.2 MG/DL (ref 0.2–1.2)
BUN BLDV-MCNC: 15 MG/DL (ref 6–22)
CALCIUM SERPL-MCNC: 9.5 MG/DL (ref 8.4–10.5)
CHLORIDE BLD-SCNC: 105 MMOL/L (ref 98–110)
CHOLESTEROL, TOTAL: 149 MG/DL (ref 110–200)
CHOLESTEROL/HDL RATIO: 3.9 (ref 0–5)
CO2: 27 MMOL/L (ref 20–32)
CREAT SERPL-MCNC: 0.9 MG/DL (ref 0.5–1.2)
CREATININE URINE: 22 MG/DL
ESTIMATED AVERAGE GLUCOSE: 133 MG/DL (ref 91–123)
GFR, ESTIMATED: >60 ML/MIN/1.73 SQ.M.
GLOBULIN: 2.5 G/DL (ref 2–4)
GLUCOSE: 87 MG/DL (ref 70–99)
HBA1C MFR BLD: 6.3 % (ref 4.8–5.6)
HCT VFR BLD CALC: 42.3 % (ref 39.3–51.6)
HDLC SERPL-MCNC: 38 MG/DL
HEMOGLOBIN: 13 G/DL (ref 13.1–17.2)
LDL CHOLESTEROL: 85 MG/DL (ref 50–99)
LDL/HDL RATIO: 2.3
MCH RBC QN AUTO: 29 PG (ref 26–34)
MCHC RBC AUTO-ENTMCNC: 31 G/DL (ref 31–36)
MCV RBC AUTO: 93 FL (ref 80–95)
MICROALB/CREAT RATIO (UG/MG CREAT.): NORMAL
MICROALBUMIN/CREAT 24H UR: <12 MG/L (ref 0.1–17)
NON-HDL CHOLESTEROL: 111 MG/DL
PDW BLD-RTO: 13.8 % (ref 10–15.5)
PLATELET # BLD: 197 K/UL (ref 140–440)
PMV BLD AUTO: 12.7 FL (ref 9–13)
POTASSIUM SERPL-SCNC: 4 MMOL/L (ref 3.5–5.5)
RBC # BLD: 4.53 M/UL (ref 3.8–5.8)
SODIUM BLD-SCNC: 141 MMOL/L (ref 133–145)
TOTAL PROTEIN: 6.7 G/DL (ref 6.4–8.3)
TRIGL SERPL-MCNC: 126 MG/DL (ref 40–149)
VLDLC SERPL CALC-MCNC: 25 MG/DL (ref 8–30)
WBC # BLD: 6.2 K/UL (ref 4–11)

## 2024-09-12 ENCOUNTER — OFFICE VISIT (OUTPATIENT)
Facility: CLINIC | Age: 49
End: 2024-09-12

## 2024-09-12 DIAGNOSIS — J30.9 NASAL SINUS INFLAMMATION DUE TO ALLERGY: ICD-10-CM

## 2024-09-12 DIAGNOSIS — E11.9 CONTROLLED TYPE 2 DIABETES MELLITUS WITHOUT COMPLICATION, WITHOUT LONG-TERM CURRENT USE OF INSULIN (HCC): Primary | ICD-10-CM

## 2024-09-12 DIAGNOSIS — E78.2 MIXED HYPERLIPIDEMIA: ICD-10-CM

## 2024-09-12 DIAGNOSIS — L73.2 HYDRADENITIS: ICD-10-CM

## 2024-09-12 RX ORDER — DOXYCYCLINE HYCLATE 100 MG
100 TABLET ORAL 2 TIMES DAILY
Qty: 14 TABLET | Refills: 0 | Status: SHIPPED | OUTPATIENT
Start: 2024-09-12 | End: 2024-09-19

## 2024-09-12 RX ORDER — FLUTICASONE PROPIONATE 50 MCG
2 SPRAY, SUSPENSION (ML) NASAL DAILY
Qty: 16 G | Refills: 5 | Status: SHIPPED | OUTPATIENT
Start: 2024-09-12

## 2024-09-12 ASSESSMENT — PATIENT HEALTH QUESTIONNAIRE - PHQ9
SUM OF ALL RESPONSES TO PHQ QUESTIONS 1-9: 0
SUM OF ALL RESPONSES TO PHQ QUESTIONS 1-9: 0
1. LITTLE INTEREST OR PLEASURE IN DOING THINGS: NOT AT ALL
SUM OF ALL RESPONSES TO PHQ QUESTIONS 1-9: 0
SUM OF ALL RESPONSES TO PHQ9 QUESTIONS 1 & 2: 0
2. FEELING DOWN, DEPRESSED OR HOPELESS: NOT AT ALL
SUM OF ALL RESPONSES TO PHQ QUESTIONS 1-9: 0

## 2024-09-25 RX ORDER — DOXYCYCLINE HYCLATE 100 MG
100 TABLET ORAL 2 TIMES DAILY
Qty: 14 TABLET | Refills: 3 | OUTPATIENT
Start: 2024-09-25 | End: 2024-10-23

## 2024-12-20 ENCOUNTER — OFFICE VISIT (OUTPATIENT)
Facility: CLINIC | Age: 49
End: 2024-12-20
Payer: MEDICARE

## 2024-12-20 VITALS
DIASTOLIC BLOOD PRESSURE: 84 MMHG | RESPIRATION RATE: 18 BRPM | WEIGHT: 247 LBS | BODY MASS INDEX: 28.58 KG/M2 | OXYGEN SATURATION: 98 % | HEART RATE: 87 BPM | HEIGHT: 78 IN | TEMPERATURE: 98.2 F | SYSTOLIC BLOOD PRESSURE: 146 MMHG

## 2024-12-20 DIAGNOSIS — E11.9 CONTROLLED TYPE 2 DIABETES MELLITUS WITHOUT COMPLICATION, WITHOUT LONG-TERM CURRENT USE OF INSULIN (HCC): ICD-10-CM

## 2024-12-20 DIAGNOSIS — M54.50 ACUTE RIGHT-SIDED LOW BACK PAIN WITHOUT SCIATICA: Primary | ICD-10-CM

## 2024-12-20 DIAGNOSIS — E78.2 MIXED HYPERLIPIDEMIA: ICD-10-CM

## 2024-12-20 DIAGNOSIS — L73.2 HYDRADENITIS: ICD-10-CM

## 2024-12-20 DIAGNOSIS — Z23 ENCOUNTER FOR IMMUNIZATION: ICD-10-CM

## 2024-12-20 DIAGNOSIS — M54.2 NECK PAIN: ICD-10-CM

## 2024-12-20 PROCEDURE — 90661 CCIIV3 VAC ABX FR 0.5 ML IM: CPT | Performed by: NURSE PRACTITIONER

## 2024-12-20 PROCEDURE — 3044F HG A1C LEVEL LT 7.0%: CPT | Performed by: NURSE PRACTITIONER

## 2024-12-20 PROCEDURE — G0008 ADMIN INFLUENZA VIRUS VAC: HCPCS | Performed by: NURSE PRACTITIONER

## 2024-12-20 PROCEDURE — 99214 OFFICE O/P EST MOD 30 MIN: CPT | Performed by: NURSE PRACTITIONER

## 2024-12-20 RX ORDER — DOXYCYCLINE HYCLATE 100 MG
100 TABLET ORAL 2 TIMES DAILY
Qty: 14 TABLET | Refills: 0 | Status: SHIPPED | OUTPATIENT
Start: 2024-12-20 | End: 2024-12-27

## 2024-12-20 RX ORDER — CYCLOBENZAPRINE HCL 5 MG
5 TABLET ORAL EVERY 8 HOURS PRN
COMMUNITY
Start: 2024-12-17

## 2024-12-20 ASSESSMENT — PATIENT HEALTH QUESTIONNAIRE - PHQ9
SUM OF ALL RESPONSES TO PHQ QUESTIONS 1-9: 0
SUM OF ALL RESPONSES TO PHQ QUESTIONS 1-9: 0
1. LITTLE INTEREST OR PLEASURE IN DOING THINGS: NOT AT ALL
2. FEELING DOWN, DEPRESSED OR HOPELESS: NOT AT ALL
SUM OF ALL RESPONSES TO PHQ QUESTIONS 1-9: 0
SUM OF ALL RESPONSES TO PHQ9 QUESTIONS 1 & 2: 0
SUM OF ALL RESPONSES TO PHQ QUESTIONS 1-9: 0

## 2024-12-20 NOTE — PATIENT INSTRUCTIONS
[facility]  [place] Location:  [address], [address]  [place], [place] [address]  Ph: [phone]  Fax: [phone]    chiropractor  Sergio Ochoa  [phone]  Cash price: one year contract 49.00  First visit 52, addtl visit 35
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
## 2024-12-27 ENCOUNTER — TELEPHONE (OUTPATIENT)
Facility: CLINIC | Age: 49
End: 2024-12-27

## 2024-12-27 NOTE — TELEPHONE ENCOUNTER
Patterson called and requested to speak to dr. Flores about his chiropractor referral. He stated that he wanted to see what chiropractor he was able to go to from the referral that was sent in for him. Thank you.

## 2025-01-20 ENCOUNTER — HOSPITAL ENCOUNTER (OUTPATIENT)
Facility: HOSPITAL | Age: 50
Setting detail: SPECIMEN
Discharge: HOME | End: 2025-01-23

## 2025-01-20 LAB — SENTARA SPECIMEN COLLECTION: NORMAL

## 2025-01-20 PROCEDURE — 99001 SPECIMEN HANDLING PT-LAB: CPT

## 2025-01-21 LAB
A/G RATIO: 1.7 RATIO (ref 1.1–2.6)
ALBUMIN: 4.1 G/DL (ref 3.5–5)
ALP BLD-CCNC: 69 U/L (ref 25–115)
ALT SERPL-CCNC: 22 U/L (ref 5–40)
ANION GAP SERPL CALCULATED.3IONS-SCNC: 9 MMOL/L (ref 3–15)
AST SERPL-CCNC: 24 U/L (ref 10–37)
BILIRUB SERPL-MCNC: 0.1 MG/DL (ref 0.2–1.2)
BUN BLDV-MCNC: 12 MG/DL (ref 6–22)
CALCIUM SERPL-MCNC: 9.5 MG/DL (ref 8.4–10.5)
CHLORIDE BLD-SCNC: 103 MMOL/L (ref 98–110)
CHOLESTEROL, TOTAL: 180 MG/DL (ref 110–200)
CHOLESTEROL/HDL RATIO: 4.4 (ref 0–5)
CO2: 29 MMOL/L (ref 20–32)
CREAT SERPL-MCNC: 0.9 MG/DL (ref 0.5–1.2)
CREATININE, URINE  MG/DL: 163 MG/DL
ESTIMATED AVERAGE GLUCOSE: 120 MG/DL (ref 91–123)
GFR, ESTIMATED: >60 ML/MIN/1.73 SQ.M.
GLOBULIN: 2.4 G/DL (ref 2–4)
GLUCOSE: 82 MG/DL (ref 70–99)
HBA1C MFR BLD: 5.8 % (ref 4.8–5.6)
HCT VFR BLD CALC: 44.8 % (ref 39.3–51.6)
HDLC SERPL-MCNC: 41 MG/DL
HEMOGLOBIN: 13.6 G/DL (ref 13.1–17.2)
LDL CHOLESTEROL: 81 MG/DL (ref 50–99)
LDL/HDL RATIO: 2
MCH RBC QN AUTO: 29 PG (ref 26–34)
MCHC RBC AUTO-ENTMCNC: 30 G/DL (ref 31–36)
MCV RBC AUTO: 95 FL (ref 80–95)
MICROALBUMIN/CREAT 24H UR: <12 MG/L (ref 0.1–17)
MICROALBUMIN/CREAT UR-RTO: NORMAL
NON-HDL CHOLESTEROL: 139 MG/DL
PDW BLD-RTO: 13.5 % (ref 10–15.5)
PLATELET # BLD: 202 K/UL (ref 140–440)
PMV BLD AUTO: 11.6 FL (ref 9–13)
POTASSIUM SERPL-SCNC: 4.3 MMOL/L (ref 3.5–5.5)
RBC # BLD: 4.7 M/UL (ref 3.8–5.8)
SODIUM BLD-SCNC: 141 MMOL/L (ref 133–145)
TOTAL PROTEIN: 6.5 G/DL (ref 6.4–8.3)
TRIGL SERPL-MCNC: 287 MG/DL (ref 40–149)
VLDLC SERPL CALC-MCNC: 57 MG/DL (ref 8–30)
WBC # BLD: 7.5 K/UL (ref 4–11)

## 2025-01-27 ENCOUNTER — OFFICE VISIT (OUTPATIENT)
Facility: CLINIC | Age: 50
End: 2025-01-27
Payer: MEDICARE

## 2025-01-27 VITALS
WEIGHT: 247 LBS | HEIGHT: 78 IN | OXYGEN SATURATION: 94 % | SYSTOLIC BLOOD PRESSURE: 120 MMHG | DIASTOLIC BLOOD PRESSURE: 71 MMHG | RESPIRATION RATE: 18 BRPM | TEMPERATURE: 97.5 F | BODY MASS INDEX: 28.58 KG/M2 | HEART RATE: 94 BPM

## 2025-01-27 DIAGNOSIS — E78.2 MIXED HYPERLIPIDEMIA: ICD-10-CM

## 2025-01-27 DIAGNOSIS — J30.9 NASAL SINUS INFLAMMATION DUE TO ALLERGY: ICD-10-CM

## 2025-01-27 DIAGNOSIS — E11.9 CONTROLLED TYPE 2 DIABETES MELLITUS WITHOUT COMPLICATION, WITHOUT LONG-TERM CURRENT USE OF INSULIN (HCC): Primary | ICD-10-CM

## 2025-01-27 PROCEDURE — 99214 OFFICE O/P EST MOD 30 MIN: CPT | Performed by: NURSE PRACTITIONER

## 2025-01-27 PROCEDURE — 3044F HG A1C LEVEL LT 7.0%: CPT | Performed by: NURSE PRACTITIONER

## 2025-01-27 RX ORDER — AZELASTINE 1 MG/ML
1 SPRAY, METERED NASAL 2 TIMES DAILY
Qty: 30 ML | Refills: 5 | Status: SHIPPED | OUTPATIENT
Start: 2025-01-27

## 2025-01-27 SDOH — ECONOMIC STABILITY: FOOD INSECURITY: WITHIN THE PAST 12 MONTHS, YOU WORRIED THAT YOUR FOOD WOULD RUN OUT BEFORE YOU GOT MONEY TO BUY MORE.: NEVER TRUE

## 2025-01-27 SDOH — ECONOMIC STABILITY: FOOD INSECURITY: WITHIN THE PAST 12 MONTHS, THE FOOD YOU BOUGHT JUST DIDN'T LAST AND YOU DIDN'T HAVE MONEY TO GET MORE.: NEVER TRUE

## 2025-01-27 ASSESSMENT — PATIENT HEALTH QUESTIONNAIRE - PHQ9
2. FEELING DOWN, DEPRESSED OR HOPELESS: NOT AT ALL
SUM OF ALL RESPONSES TO PHQ QUESTIONS 1-9: 0
1. LITTLE INTEREST OR PLEASURE IN DOING THINGS: NOT AT ALL
SUM OF ALL RESPONSES TO PHQ9 QUESTIONS 1 & 2: 0

## 2025-01-27 NOTE — PROGRESS NOTES
[address] [place] [address]               [phone]      Nick Moyer is a 49 y.o. male and presents with Follow-up Chronic Condition, Diabetes, and Cholesterol Problem       Assessment/Plan:    Assessment & Plan  Controlled type 2 diabetes mellitus without complication, without long-term current use of insulin (HCC)   Chronic, at goal (stable), continue current treatment plan    Orders:    Comprehensive Metabolic Panel; Future    Hemoglobin A1C; Future    Mixed hyperlipidemia   Chronic, at goal (stable), continue current treatment plan    Orders:    Lipid Panel; Future    Nasal sinus inflammation due to allergy   Refill provided    Orders:    azelastine (ASTELIN) 0.1 % nasal spray; 1 spray by Nasal route 2 times daily Use in each nostril as directed     Labs prior to next visit  Patient verbalized understanding and is in agreement with this plan of care        Follow up and disposition:   Return in about 6 months (around 7/27/2025) for MAWV, DM, HLD, 30min, office, w/labsprior.      Subjective:    Labs obtained prior to visit? Yes  Reviewed with patient? yes    DMII-   Patient reports medication compliance Yes  Diabetic diet compliance frequently  Patient monitors blood sugars regularly never   Home glucose readings: does not check   Denies hypoglycemic episodes Yes  Denies polyuria, polydipsia, paraesthesia, vision changes? Yes  Engaging in daily exercise? Routine     Diabetes Management   Topic Date Due    Diabetic foot exam  11/22/2023    Diabetic Alb to Cr ratio (uACR) test  02/27/2024    Diabetic retinal exam  04/21/2024     Lab Results   Component Value Date/Time    LABA1C 5.8 01/20/2025 12:05 PM   ]  Lab Results   Component Value Date/Time    MALBCR  08/20/2024 01:12 PM      Comment:      Unable to calculate Microablumin/Creatinine Ratio       ]  Diabetic medications:   Diabetic Medications       Biguanides       metFORMIN (GLUCOPHAGE) 1000 MG tablet TAKE 1 TABLET BY

## 2025-01-27 NOTE — PATIENT INSTRUCTIONS
[facility]  [place] Location:  [address] [place] [address], [address]  [place] [address]  Ph: [phone]

## 2025-01-27 NOTE — PROGRESS NOTES
Room 8     Nick Moyer had concerns including Follow-up Chronic Condition, Diabetes, and Cholesterol Problem. for today's visit .     When asked if patient has any concerns he would like to address with ANA Flores pt states I would like a Disable parking placard due to foot pain . ANA Flores has been notified  of patient concerns .      Did you take your medication today? Pt states yes       1. \"Have you been to the ER, urgent care clinic since your last visit?  Hospitalized since your last visit?\" .NO    2. \"Have you seen or consulted any other health care providers outside of the [facility] since your last visit?\" NO     3. For patients aged 45-75: Has the patient had a colonoscopy / FIT/ Cologuard? Yes      If the patient is female:    4. For patients aged 40-74: Has the patient had a mammogram within the past 2 years? N/A      5. For patients aged 21-65: Has the patient had a pap smear? {Cancer Care Gap present? N/A              1/27/2025     3:48 PM   PHQ-9    Little interest or pleasure in doing things 0   Feeling down, depressed, or hopeless 0   PHQ-2 Score 0   PHQ-9 Total Score 0          Health Maintenance Due   Topic Date Due    Pneumococcal 0-64 years Vaccine (1 of 2 - PCV) Never done    Hepatitis B vaccine (1 of 3 - 19+ 3-dose series) Never done    DTaP/Tdap/Td vaccine (1 - Tdap) Never done    Diabetic foot exam  11/22/2023    Diabetic Alb to Cr ratio (uACR) test  02/27/2024    Diabetic retinal exam  04/21/2024    COVID-19 Vaccine (4 - 2023-24 season) 09/01/2024    Annual Wellness Visit (Medicare Advantage)  01/01/2025

## 2025-01-28 DIAGNOSIS — L73.2 HYDRADENITIS: ICD-10-CM

## 2025-01-29 RX ORDER — DOXYCYCLINE HYCLATE 100 MG
100 TABLET ORAL 2 TIMES DAILY
Qty: 14 TABLET | Refills: 0 | OUTPATIENT
Start: 2025-01-29 | End: 2025-02-05

## 2025-02-03 ENCOUNTER — TELEPHONE (OUTPATIENT)
Facility: CLINIC | Age: 50
End: 2025-02-03

## 2025-02-07 ENCOUNTER — TELEPHONE (OUTPATIENT)
Facility: CLINIC | Age: 50
End: 2025-02-07

## 2025-02-07 NOTE — TELEPHONE ENCOUNTER
Mr.Patterson called requesting a refill on the following medication:  - doxycycline hyclate (VIBRA-TABS) 100 MG tablet  He stated that he needs this medication because he is unable to get an appointment with the dermatologist until 3/17/25. I let hi know I would relay the message to you.

## 2025-02-12 ENCOUNTER — TELEPHONE (OUTPATIENT)
Facility: CLINIC | Age: 50
End: 2025-02-12

## 2025-02-12 DIAGNOSIS — E11.9 CONTROLLED TYPE 2 DIABETES MELLITUS WITHOUT COMPLICATION, WITHOUT LONG-TERM CURRENT USE OF INSULIN (HCC): ICD-10-CM

## 2025-02-12 NOTE — TELEPHONE ENCOUNTER
Mr. Patterson called and requested refills on the following two medications:  - azelastine (ASTELIN) 0.1% nasal spray  - metFORMIN (GLUCOPHAGE) 1000 MG tablet

## 2025-02-18 DIAGNOSIS — E11.9 CONTROLLED TYPE 2 DIABETES MELLITUS WITHOUT COMPLICATION, WITHOUT LONG-TERM CURRENT USE OF INSULIN (HCC): ICD-10-CM

## 2025-02-18 DIAGNOSIS — J30.9 NASAL SINUS INFLAMMATION DUE TO ALLERGY: ICD-10-CM

## 2025-02-20 RX ORDER — AZELASTINE 1 MG/ML
1 SPRAY, METERED NASAL 2 TIMES DAILY
Qty: 30 ML | Refills: 5 | Status: SHIPPED | OUTPATIENT
Start: 2025-02-20

## 2025-03-20 DIAGNOSIS — E78.2 MIXED HYPERLIPIDEMIA: ICD-10-CM

## 2025-03-21 RX ORDER — ATORVASTATIN CALCIUM 20 MG/1
20 TABLET, FILM COATED ORAL DAILY
Qty: 90 TABLET | Refills: 1 | Status: SHIPPED | OUTPATIENT
Start: 2025-03-21

## 2025-05-15 ENCOUNTER — TELEPHONE (OUTPATIENT)
Facility: CLINIC | Age: 50
End: 2025-05-15

## 2025-05-15 DIAGNOSIS — E11.9 CONTROLLED TYPE 2 DIABETES MELLITUS WITHOUT COMPLICATION, WITHOUT LONG-TERM CURRENT USE OF INSULIN (HCC): ICD-10-CM

## 2025-05-15 NOTE — TELEPHONE ENCOUNTER
Pt called requesting refill of metFORMIN HCl 1,000 mg to be sent to pharmacy on file.     Next appt: 7/21  Last appt: 1/27

## 2025-05-20 DIAGNOSIS — E11.9 CONTROLLED TYPE 2 DIABETES MELLITUS WITHOUT COMPLICATION, WITHOUT LONG-TERM CURRENT USE OF INSULIN (HCC): ICD-10-CM

## 2025-05-20 DIAGNOSIS — J30.9 NASAL SINUS INFLAMMATION DUE TO ALLERGY: ICD-10-CM

## 2025-05-20 NOTE — TELEPHONE ENCOUNTER
Mr. Moyer is requesting a refill on the following:   - metFORMIN (GLUCOPHAGE) 1000 MG tablet   - fluticasone (FLONASE) 50 MCG/ACT nasal spray   - cetirizine (ZYRTEC) 10 MG tablet     He stated he had called in a refill for his metFORMIN last week on 5/15 and now is completely out of this medication. He stated he needs this as soon as possible.

## 2025-05-20 NOTE — TELEPHONE ENCOUNTER
Requested Prescriptions     Pending Prescriptions Disp Refills    metFORMIN (GLUCOPHAGE) 1000 MG tablet 180 tablet 1     Sig: Take 1 tablet by mouth 2 times daily (with meals)    fluticasone (FLONASE) 50 MCG/ACT nasal spray 16 g 5     Si sprays by Each Nostril route daily    cetirizine (ZYRTEC) 10 MG tablet 90 tablet 1     Sig: Take 1 tablet by mouth daily     Last seen: 25  Next seen: 25

## 2025-05-21 RX ORDER — CETIRIZINE HYDROCHLORIDE 10 MG/1
10 TABLET ORAL DAILY
Qty: 90 TABLET | Refills: 1 | Status: SHIPPED | OUTPATIENT
Start: 2025-05-21

## 2025-05-21 RX ORDER — FLUTICASONE PROPIONATE 50 MCG
2 SPRAY, SUSPENSION (ML) NASAL DAILY
Qty: 16 G | Refills: 5 | Status: SHIPPED | OUTPATIENT
Start: 2025-05-21

## 2025-07-29 ENCOUNTER — OFFICE VISIT (OUTPATIENT)
Facility: CLINIC | Age: 50
End: 2025-07-29
Payer: MEDICARE

## 2025-07-29 VITALS
TEMPERATURE: 98.4 F | RESPIRATION RATE: 16 BRPM | HEART RATE: 89 BPM | SYSTOLIC BLOOD PRESSURE: 105 MMHG | WEIGHT: 243 LBS | BODY MASS INDEX: 28.11 KG/M2 | OXYGEN SATURATION: 96 % | DIASTOLIC BLOOD PRESSURE: 68 MMHG | HEIGHT: 78 IN

## 2025-07-29 DIAGNOSIS — M25.572 ACUTE LEFT ANKLE PAIN: ICD-10-CM

## 2025-07-29 DIAGNOSIS — E78.2 MIXED HYPERLIPIDEMIA: ICD-10-CM

## 2025-07-29 DIAGNOSIS — J30.9 NASAL SINUS INFLAMMATION DUE TO ALLERGY: ICD-10-CM

## 2025-07-29 DIAGNOSIS — L70.8 OTHER ACNE: ICD-10-CM

## 2025-07-29 DIAGNOSIS — S93.402A SPRAIN OF LEFT ANKLE, UNSPECIFIED LIGAMENT, INITIAL ENCOUNTER: ICD-10-CM

## 2025-07-29 DIAGNOSIS — E11.9 CONTROLLED TYPE 2 DIABETES MELLITUS WITHOUT COMPLICATION, WITHOUT LONG-TERM CURRENT USE OF INSULIN (HCC): ICD-10-CM

## 2025-07-29 DIAGNOSIS — Z11.3 SCREEN FOR STD (SEXUALLY TRANSMITTED DISEASE): ICD-10-CM

## 2025-07-29 DIAGNOSIS — Z00.00 MEDICARE ANNUAL WELLNESS VISIT, SUBSEQUENT: Primary | ICD-10-CM

## 2025-07-29 DIAGNOSIS — Z71.89 ADVANCED CARE PLANNING/COUNSELING DISCUSSION: ICD-10-CM

## 2025-07-29 LAB
A/G RATIO: 1.6 RATIO (ref 1.1–2.6)
ALBUMIN: 4.5 G/DL (ref 3.5–5)
ALP BLD-CCNC: 74 U/L (ref 25–115)
ALT SERPL-CCNC: 27 U/L (ref 5–40)
ANION GAP SERPL CALCULATED.3IONS-SCNC: 10 MMOL/L (ref 3–15)
AST SERPL-CCNC: 24 U/L (ref 10–37)
BILIRUB SERPL-MCNC: 0.3 MG/DL (ref 0.2–1.2)
BUN BLDV-MCNC: 11 MG/DL (ref 6–22)
CALCIUM SERPL-MCNC: 9.2 MG/DL (ref 8.4–10.5)
CHLORIDE BLD-SCNC: 105 MMOL/L (ref 98–110)
CHOLESTEROL, TOTAL: 200 MG/DL (ref 110–200)
CHOLESTEROL/HDL RATIO: 4.1 (ref 0–5)
CO2: 29 MMOL/L (ref 20–32)
CREAT SERPL-MCNC: 1 MG/DL (ref 0.5–1.2)
ESTIMATED AVERAGE GLUCOSE: 120 MG/DL (ref 91–123)
GFR, ESTIMATED: 88.6 ML/MIN/1.73 SQ.M.
GLOBULIN: 2.8 G/DL (ref 2–4)
GLUCOSE: 89 MG/DL (ref 70–99)
HBA1C MFR BLD: 5.8 % (ref 4.8–5.6)
HDLC SERPL-MCNC: 49 MG/DL
HIV INTERPRETATION: NORMAL
HIV1/0/2 AB/AG: NON REACTIVE
LDL CHOLESTEROL: 121 MG/DL (ref 50–99)
LDL/HDL RATIO: 2.5
NON-HDL CHOLESTEROL: 151 MG/DL
POTASSIUM SERPL-SCNC: 4.1 MMOL/L (ref 3.5–5.5)
SODIUM BLD-SCNC: 144 MMOL/L (ref 133–145)
TOTAL PROTEIN: 7.3 G/DL (ref 6.4–8.3)
TRIGL SERPL-MCNC: 147 MG/DL (ref 40–149)
VLDLC SERPL CALC-MCNC: 29 MG/DL (ref 8–30)

## 2025-07-29 PROCEDURE — 3044F HG A1C LEVEL LT 7.0%: CPT | Performed by: NURSE PRACTITIONER

## 2025-07-29 PROCEDURE — G0439 PPPS, SUBSEQ VISIT: HCPCS | Performed by: NURSE PRACTITIONER

## 2025-07-29 RX ORDER — ATORVASTATIN CALCIUM 20 MG/1
20 TABLET, FILM COATED ORAL DAILY
Qty: 90 TABLET | Refills: 1 | Status: SHIPPED | OUTPATIENT
Start: 2025-07-29

## 2025-07-29 RX ORDER — CETIRIZINE HYDROCHLORIDE 10 MG/1
10 TABLET ORAL DAILY
Qty: 90 TABLET | Refills: 1 | Status: SHIPPED | OUTPATIENT
Start: 2025-07-29

## 2025-07-29 RX ORDER — CYCLOBENZAPRINE HCL 5 MG
5 TABLET ORAL EVERY 8 HOURS PRN
Qty: 90 TABLET | Refills: 1 | Status: SHIPPED | OUTPATIENT
Start: 2025-07-29

## 2025-07-29 RX ORDER — FAMOTIDINE 20 MG/1
20 TABLET, FILM COATED ORAL DAILY
Qty: 90 TABLET | Refills: 1 | Status: SHIPPED | OUTPATIENT
Start: 2025-07-29

## 2025-07-29 RX ORDER — CLINDAMYCIN PHOSPHATE 10 UG/ML
LOTION TOPICAL
Qty: 60 G | Refills: 2 | Status: SHIPPED | OUTPATIENT
Start: 2025-07-29 | End: 2025-08-28

## 2025-07-29 ASSESSMENT — PATIENT HEALTH QUESTIONNAIRE - PHQ9
1. LITTLE INTEREST OR PLEASURE IN DOING THINGS: NOT AT ALL
SUM OF ALL RESPONSES TO PHQ QUESTIONS 1-9: 0
2. FEELING DOWN, DEPRESSED OR HOPELESS: NOT AT ALL
SUM OF ALL RESPONSES TO PHQ QUESTIONS 1-9: 0

## 2025-07-29 ASSESSMENT — LIFESTYLE VARIABLES
HOW OFTEN DO YOU HAVE A DRINK CONTAINING ALCOHOL: 2-4 TIMES A MONTH
HOW MANY STANDARD DRINKS CONTAINING ALCOHOL DO YOU HAVE ON A TYPICAL DAY: 1 OR 2

## 2025-07-29 NOTE — ACP (ADVANCE CARE PLANNING)
Advance Care Planning   General Advance Care Planning (ACP) Conversation    Date of Conversation: 7/29/2025  Conducted with: Patient with Decision Making Capacity  Other persons present: None    Healthcare Decision Maker:    Primary Decision Maker: Negin melchorjorie [facility] - [phone]    Secondary Decision Maker: Chandrakant Hunter - [phone]      Content/Action Overview:  DECLINED ACP Conversation - will revisit periodically  Reviewed DNR/DNI and patient elects Full Code (Attempt Resuscitation)        Length of Voluntary ACP Conversation in minutes:  <16 minutes (Non-Billable)    Caryn Flores, APRN - CNP

## 2025-07-29 NOTE — PATIENT INSTRUCTIONS
instruction, always ask your healthcare professional. [employer], disclaims any warranty or liability for your use of this information.         A Healthy Heart: Care Instructions  Overview     Coronary artery disease, also called heart disease, occurs when a substance called plaque builds up in the vessels that supply oxygen-rich blood to your heart muscle. This can narrow the blood vessels and reduce blood flow. A heart attack happens when blood flow is completely blocked. A high-fat diet, smoking, and other factors increase the risk of heart disease.  Your doctor has found that you have a chance of having heart disease. A heart-healthy lifestyle can help keep your heart healthy and prevent heart disease. This lifestyle includes eating healthy, being active, staying at a weight that's healthy for you, and not smoking or using tobacco. It also includes taking medicines as directed, managing other health conditions, and trying to get a healthy amount of sleep.  Follow-up care is a key part of your treatment and safety. Be sure to make and go to all appointments, and call your doctor if you are having problems. It's also a good idea to know your test results and keep a list of the medicines you take.  How can you care for yourself at home?  Diet    Use less salt when you cook and eat. This helps lower your blood pressure. Taste food before salting. Add only a little salt when you think you need it. With time, your taste buds will adjust to less salt.     Eat fewer snack items, fast foods, canned soups, and other high-salt, high-fat, processed foods.     Read food labels and try to avoid saturated and trans fats. They increase your risk of heart disease by raising cholesterol levels.     Limit the amount of solid fat--butter, margarine, and shortening--you eat. Use olive, peanut, or canola oil when you cook. Bake, broil, and steam foods instead of frying them.     Eat a variety of fruit and vegetables every

## 2025-07-29 NOTE — PROGRESS NOTES
Have you been to the ER, urgent care clinic since your last visit?  Hospitalized since your last visit?   NO    Have you seen or consulted any other health care providers outside our system since your last visit?   NO      “Have you had a diabetic eye exam?”    NO     Date of last diabetic eye exam: 4/21/2023

## 2025-07-29 NOTE — PROGRESS NOTES
[address] [place] [address]               [phone]    Medicare Annual Wellness Visit    Nick Moyer is here for Medicare AWV, Urinary Retention (Urinates and feels as if he needs to go more. Has to hold stomach and strain to go more on/off.), and Rash (On face)    Assessment & Plan  Diabetes Mellitus:  - A1C was 5.8 in 01/2025, indicating good control  - Currently taking metformin 1000 mg twice a day; reports numbness in hands  - Advised to continue medication regimen, maintain a diet low in carbohydrates and sugars, and engage in regular exercise (gym once or twice a week, bike riding)  - Blood work will be done today to assess current status    Urinary Retention:  - Reports occasional straining to urinate  - Advised to follow up with urology for further evaluation and management  - Nurse visit can be scheduled sooner to check for postvoid residual per urology notes    Hyperlipidemia:  - Taking atorvastatin 20 mg daily; reports no muscle cramps or unusual pains  - Advised to continue current medication and maintain a diet low in fats and fried foods  - Prescription for atorvastatin has been provided    Skin Issues:  - Reports issues with the skin on his face; has used clindamycin solution in the past  - Prescription for clindamycin solution will be provided  - If symptoms persist, advised to follow up with dermatology    Health Maintenance:  - Has not seen a dentist in the past year; advised to schedule an appointment  - Screening for sexually transmitted diseases, including chlamydia, gonorrhea, trichomonas, HIV, and syphilis, will be done today  - Blood work will be done today    Medication Management:  - Refills for famotidine, Flexeril, and cetirizine have been provided    Follow-up:  - The patient will follow up in 6 months with blood work done beforehand    Medicare annual wellness visit, subsequent  Advanced care planning/counseling discussion  Controlled type 2  [FreeTextEntry1] : # fasting blood work done in office \par # Start Rx Fluticasone Propionate 50 MCG/ACT\par # referral to ENT\par # order visual acuity \par # advised to tilt head forward when using nasal spray \par # advised to bring in Covid-19 vaccine card\par # advised to consider receiving pneumonia vaccine\par # advised to receive shingles vaccines at local pharmacy\par # f/u in one year or sooner if needed

## 2025-07-30 LAB — T. PALLIDUM, IGG/IGM: NON REACTIVE

## 2025-08-01 LAB
CHLAMYDIA AMPLIFIED URINE: NEGATIVE
GC AMPLIFIED URINE: NEGATIVE
TRICHOMONAS NUC AMP-URINE: NEGATIVE

## 2025-08-27 DIAGNOSIS — E11.9 CONTROLLED TYPE 2 DIABETES MELLITUS WITHOUT COMPLICATION, WITHOUT LONG-TERM CURRENT USE OF INSULIN (HCC): ICD-10-CM

## 2025-09-02 DIAGNOSIS — E11.9 CONTROLLED TYPE 2 DIABETES MELLITUS WITHOUT COMPLICATION, WITHOUT LONG-TERM CURRENT USE OF INSULIN (HCC): ICD-10-CM
